# Patient Record
Sex: MALE | Race: WHITE | NOT HISPANIC OR LATINO | URBAN - METROPOLITAN AREA
[De-identification: names, ages, dates, MRNs, and addresses within clinical notes are randomized per-mention and may not be internally consistent; named-entity substitution may affect disease eponyms.]

---

## 2021-01-11 ENCOUNTER — TELEPHONE (OUTPATIENT)
Dept: SURGERY | Facility: CLINIC | Age: 36
End: 2021-01-11

## 2021-01-11 ENCOUNTER — OFFICE VISIT (OUTPATIENT)
Dept: FAMILY MEDICINE | Facility: CLINIC | Age: 36
End: 2021-01-11

## 2021-01-11 VITALS
HEIGHT: 70 IN | OXYGEN SATURATION: 99 % | DIASTOLIC BLOOD PRESSURE: 82 MMHG | WEIGHT: 290 LBS | BODY MASS INDEX: 41.52 KG/M2 | SYSTOLIC BLOOD PRESSURE: 126 MMHG | RESPIRATION RATE: 17 BRPM | HEART RATE: 74 BPM

## 2021-01-11 DIAGNOSIS — Z13.228 SCREENING FOR ENDOCRINE, NUTRITIONAL, METABOLIC AND IMMUNITY DISORDER: ICD-10-CM

## 2021-01-11 DIAGNOSIS — Z11.59 NEED FOR HEPATITIS C SCREENING TEST: ICD-10-CM

## 2021-01-11 DIAGNOSIS — E66.01 CLASS 3 SEVERE OBESITY DUE TO EXCESS CALORIES WITHOUT SERIOUS COMORBIDITY WITH BODY MASS INDEX (BMI) OF 40.0 TO 44.9 IN ADULT (H): ICD-10-CM

## 2021-01-11 DIAGNOSIS — Z13.0 SCREENING FOR ENDOCRINE, NUTRITIONAL, METABOLIC AND IMMUNITY DISORDER: ICD-10-CM

## 2021-01-11 DIAGNOSIS — Z13.21 SCREENING FOR ENDOCRINE, NUTRITIONAL, METABOLIC AND IMMUNITY DISORDER: ICD-10-CM

## 2021-01-11 DIAGNOSIS — Z11.4 ENCOUNTER FOR SCREENING FOR HIV: ICD-10-CM

## 2021-01-11 DIAGNOSIS — Z00.00 ROUTINE GENERAL MEDICAL EXAMINATION AT A HEALTH CARE FACILITY: Primary | ICD-10-CM

## 2021-01-11 DIAGNOSIS — Z13.220 SCREENING FOR LIPOID DISORDERS: ICD-10-CM

## 2021-01-11 DIAGNOSIS — L73.2 AXILLARY HIDRADENITIS SUPPURATIVA: ICD-10-CM

## 2021-01-11 DIAGNOSIS — Z23 INFLUENZA VACCINE NEEDED: ICD-10-CM

## 2021-01-11 DIAGNOSIS — E66.813 CLASS 3 SEVERE OBESITY DUE TO EXCESS CALORIES WITHOUT SERIOUS COMORBIDITY WITH BODY MASS INDEX (BMI) OF 40.0 TO 44.9 IN ADULT (H): ICD-10-CM

## 2021-01-11 DIAGNOSIS — Z13.29 SCREENING FOR ENDOCRINE, NUTRITIONAL, METABOLIC AND IMMUNITY DISORDER: ICD-10-CM

## 2021-01-11 PROCEDURE — 99385 PREV VISIT NEW AGE 18-39: CPT | Mod: 25 | Performed by: NURSE PRACTITIONER

## 2021-01-11 PROCEDURE — 36415 COLL VENOUS BLD VENIPUNCTURE: CPT | Performed by: NURSE PRACTITIONER

## 2021-01-11 PROCEDURE — 90471 IMMUNIZATION ADMIN: CPT | Performed by: NURSE PRACTITIONER

## 2021-01-11 PROCEDURE — 90686 IIV4 VACC NO PRSV 0.5 ML IM: CPT | Performed by: NURSE PRACTITIONER

## 2021-01-11 RX ORDER — CLINDAMYCIN PHOSPHATE 10 UG/ML
LOTION TOPICAL 2 TIMES DAILY
Qty: 60 ML | Refills: 11 | Status: SHIPPED | OUTPATIENT
Start: 2021-01-11 | End: 2023-05-25

## 2021-01-11 RX ORDER — FLUTICASONE PROPIONATE 50 MCG
2 SPRAY, SUSPENSION (ML) NASAL
COMMUNITY
Start: 2019-11-29 | End: 2024-03-01

## 2021-01-11 SDOH — HEALTH STABILITY: MENTAL HEALTH: HOW OFTEN DO YOU HAVE A DRINK CONTAINING ALCOHOL?: NOT ASKED

## 2021-01-11 SDOH — HEALTH STABILITY: MENTAL HEALTH: HOW OFTEN DO YOU HAVE 6 OR MORE DRINKS ON ONE OCCASION?: NOT ASKED

## 2021-01-11 SDOH — HEALTH STABILITY: MENTAL HEALTH: HOW MANY STANDARD DRINKS CONTAINING ALCOHOL DO YOU HAVE ON A TYPICAL DAY?: NOT ASKED

## 2021-01-11 ASSESSMENT — MIFFLIN-ST. JEOR: SCORE: 2253.74

## 2021-01-11 NOTE — TELEPHONE ENCOUNTER
Reason for call:  Other   Patient called regarding (reason for call): appointment  Additional comments: Please attach non surgical questionnaires for appointment tomorrow.     Phone number to reach patient:  Home number on file [phone] (home)    Best Time:  Anytime     Can we leave a detailed message on this number?  Not Applicable    Travel screening: Not Applicable

## 2021-01-11 NOTE — PATIENT INSTRUCTIONS
Preventive Health Recommendations  Male Ages 26 - 39    Yearly exam:             See your health care provider every year in order to  o   Review health changes.   o   Discuss preventive care.    o   Review your medicines if your doctor has prescribed any.    You should be tested each year for STDs (sexually transmitted diseases), if you re at risk.     After age 35, talk to your provider about cholesterol testing. If you are at risk for heart disease, have your cholesterol tested at least every 5 years.     If you are at risk for diabetes, you should have a diabetes test (fasting glucose).  Shots: Get a flu shot each year. Get a tetanus shot every 10 years.     Nutrition:    Eat at least 5 servings of fruits and vegetables daily.     Eat whole-grain bread, whole-wheat pasta and brown rice instead of white grains and rice.     Get adequate Calcium and Vitamin D.     Lifestyle    Exercise for at least 150 minutes a week (30 minutes a day, 5 days a week). This will help you control your weight and prevent disease.     Limit alcohol to one drink per day.     No smoking.     Wear sunscreen to prevent skin cancer.     See your dentist every six months for an exam and cleaning.     Clindamycin twice daily for 16 weeks    General Information:     Today you had your appointment with Kenzie Canada CNP  My hours are:     Monday 8 AM - 5 PM  Tuesday: 8 AM - 5 PM  Wednesday: 8 AM - 5 PM  Thursday: 8 AM - 5 PM     I am not in the office Fridays. Therefore, non urgent calls received on Friday will be addressed when I am back in the office on Monday.      If lab work was done today as part of your evaluation you will generally be contacted via My Chart, mail, or phone with the results within 1-5 days. If there is an alarming result we will contact you by phone. Lab results come back at varying times- I generally wait until all labs are resulted before making comments on results. Please note labs are automatically released to  My Chart once available.      If you need refills please contact your pharmacy. They will send a refill request to me to review. Please allow 3 business days for us to process all refill requests.      Please call or send a medical message with any questions or concerns    We are working to improve our digital reputation.  Would you please help us by reviewing our clinic on Google and/or Facebook?  These are links for filling out a review for the clinic:    [email or web address]                [email or web address]    We truly appreciate you taking the time to do this.

## 2021-01-11 NOTE — PROGRESS NOTES
"  3  SUBJECTIVE:   CC: Yamil Spencer is an 35 year old male who presents for preventive health visit.     #1) Weight: Has gained weight the past year. Joined a fitness group, exercises four times per week. Notes that he stress eats. Normally 260, sometimes 270. Would like to be 240-250. Lowest was 225 when he was running every day and eating spinach. Felt really good around 240. Works around food daily, wants to make sure he has a healthy relationship with food. Sister is being treated for an eating disorder currently.  #2) Skin lesions: Notes that he gets painful pimple like bumps in his armpits, upper thighs, and groin. Has been on oral medication in the past with flares and this has been helpful.        Patient has been advised of split billing requirements and indicates understanding: Yes  Healthy Habits:    Do you get at least three servings of calcium containing foods daily (dairy, green leafy vegetables, etc.)? yes    Amount of exercise or daily activities, outside of work: 4 day(s) per week    Problems taking medications regularly No    Medication side effects: No    Have you had an eye exam in the past two years? yes    Do you see a dentist twice per year? yes    Do you have sleep apnea, excessive snoring or daytime drowsiness?no    Today's PHQ-2 Score:   PHQ-2 ( 1999 [employer]) 1/11/2021 12/18/2015   Q1: Little interest or pleasure in doing things 0 0   Q2: Feeling down, depressed or hopeless 0 0   PHQ-2 Score 0 0     Feels mental health is good but plans to see a counselor- describes \"minor anxiety\", always fidgeting, but states he has always been this way. Sleeping okay.    Abuse: Current or Past(Physical, Sexual or Emotional)- No  Do you feel safe in your environment? Yes    Social History     Tobacco Use     Smoking status: Former Smoker     Packs/day: 0.50     Years: 6.00     Pack years: 3.00     Types: Cigarettes     Smokeless tobacco: Never Used   Substance Use Topics     Alcohol use: Yes     " "Alcohol/week: 0.0 standard drinks     Comment: 3 beers a day.     If you drink alcohol do you typically have >3 drinks per day or >7 drinks per week? Not Applicable      Reviewed orders with patient. Reviewed health maintenance and updated orders accordingly - Yes      Reviewed and updated as needed this visit by clinical staff  Tobacco  Allergies               Reviewed and updated as needed this visit by Provider  Tobacco  Allergies  Meds  Problems  Med Hx  Surg Hx  Fam Hx         Past Medical History:   Diagnosis Date     Acne     folliculitis     Plantar fasciitis, bilateral      Seasonal allergies      Tobacco abuse     quit smoking in 2019. 10 pack year history.      Past Surgical History:   Procedure Laterality Date     ANKLE SURGERY  01/01/2008    fracture rt, ORIF plate and screws     KNEE SURGERY Left 2003    chipped bone       ROS:  Gen, HEENT, CV, resp, GI, , MSK, heme/lymph, endo, skin, neuro, psych negative except as listed per HPI      OBJECTIVE:   Physical Exam:    /82   Pulse 74   Resp 17   Ht 1.765 m (5' 9.5\")   Wt 131.5 kg (290 lb)   SpO2 99%   BMI 42.21 kg/m      CONSTITUTIONAL: Alert non-toxic appearing male in no acute distress  HEAD: Normocephalic, atraumatic  EYES: PERRLA; no scleral icterus; sclera without injection  ENT: EACs clear bilaterally, TMs pearly gray and intact with good visualization of bony landmarks and cone of light, no bulging or erythema; nares patent without ulceration or edema; oropharynx pink without lesions; posterior pharynx without exudates  NECK: Neck supple without lymphadenopathy, thyroid without enlargement or nodularity  RESPIRATORY: Lungs clear to auscultation, respirations unlabored  CV: Regular rate and rhythm, S1S2, no clicks, murmurs, rubs, or gallops; bilateral lower extremities without edema, posterior tibialis pulses 2+ bilaterally  GASTROINTESTINAL: Normoactive bowel sounds x4 quadrants, abdomen soft, non-distended, and non-tender to " palpation without masses or organomegaly  MUSCULOSKELETAL: Moves all extremities, no obvious muscle wasting  NEUROLOGIC: No gross deficits, normal gait  SKIN: Appropriate color for race, warm and dry, no suspicious lesions or rashes; scattered erythematous papular lesions to axillae bilaterally  PSYCHIATRIC: Pleasant and interactive, affect euthymic, makes appropriate eye contact, thought process logical      Diagnostic Test Results:  Labs reviewed in Epic  No results found for this or any previous visit (from the past 24 hour(s)).    ASSESSMENT/PLAN:   Yamil was seen today for new patient, physical, recheck medication and weight check.    Diagnoses and all orders for this visit:    Routine general medical examination at a health care facility    Well appearing 35 year old male. See below for counseling.    Need for hepatitis C screening test  -     HCV Antibody ([facility])    Encounter for screening for HIV  -     HIV 1/0/2 Rflx ([facility])    Screening for lipoid disorders  -     Lipid Panel ([facility])    Screening for endocrine, nutritional, metabolic and immunity disorder  -     Basic metabolic panel; Future    Notes fam hx of diabetes and pre-diabetes- if fasting glucose elevated, add on A1C    Class 3 severe obesity due to excess calories without serious comorbidity with body mass index (BMI) of 40.0 to 44.9 in adult (H)  -     COMPREHENSIVE WEIGHT MANAGEMENT    Discussed weight loss measures, would like to see medical weight management clinic and nutritionist. His goal weight is about 240lb as he felt really good at this weight.    Influenza vaccine needed  -     HC FLU VAC PRESRV FREE QUAD SPLIT VIR > 6 MONTHS IM  -     ADMIN 1st VACCINE    Axillary hidradenitis suppurativa  -     clindamycin (CLEOCIN T) 1 % external lotion; Apply topically 2 times daily    Topical clindamycin twice daily x16 weeks. To call for flares, may need oral doxycycline during these periods.    Patient has been advised of split billing  "requirements and indicates understanding: Yes  COUNSELING:  Reviewed preventive health counseling, as reflected in patient instructions  Special attention given to:        Regular exercise       Healthy diet/nutrition       Immunizations    Vaccinated for: Influenza             Consider Hep C screening for all patients one time for ages 18-79 years       HIV screeninx in teen years, 1x in adult years, and at intervals if high risk    Estimated body mass index is 39.46 kg/m  as calculated from the following:    Height as of 12/18/15: 1.778 m (5' 10\").    Weight as of 12/18/15: 124.7 kg (275 lb).    Weight management plan: Patient referred to endocrine and/or weight management specialty    He reports that he has quit smoking. His smoking use included cigarettes. He has a 3.00 pack-year smoking history. He has never used smokeless tobacco.      Counseling Resources:  ATP IV Guidelines  Pooled Cohorts Equation Calculator  FRAX Risk Assessment  ICSI Preventive Guidelines  Dietary Guidelines for Americans,   USDA's MyPlate  ASA Prophylaxis  Lung CA Screening    MARY Luo CNP  [facility]  "

## 2021-01-12 ENCOUNTER — VIRTUAL VISIT (OUTPATIENT)
Dept: SURGERY | Facility: CLINIC | Age: 36
End: 2021-01-12
Payer: COMMERCIAL

## 2021-01-12 VITALS — BODY MASS INDEX: 42.95 KG/M2 | WEIGHT: 290 LBS | HEIGHT: 69 IN

## 2021-01-12 VITALS — HEIGHT: 69 IN | WEIGHT: 290 LBS | BODY MASS INDEX: 42.95 KG/M2

## 2021-01-12 DIAGNOSIS — E66.9 OBESITY: ICD-10-CM

## 2021-01-12 DIAGNOSIS — E66.01 MORBID OBESITY (H): Primary | ICD-10-CM

## 2021-01-12 DIAGNOSIS — E66.01 MORBID OBESITY (H): ICD-10-CM

## 2021-01-12 DIAGNOSIS — L73.2 AXILLARY HIDRADENITIS SUPPURATIVA: ICD-10-CM

## 2021-01-12 PROCEDURE — 99417 PROLNG OP E/M EACH 15 MIN: CPT | Performed by: PHYSICIAN ASSISTANT

## 2021-01-12 PROCEDURE — 97802 MEDICAL NUTRITION INDIV IN: CPT | Mod: GT | Performed by: DIETITIAN, REGISTERED

## 2021-01-12 PROCEDURE — 99205 OFFICE O/P NEW HI 60 MIN: CPT | Mod: 95 | Performed by: PHYSICIAN ASSISTANT

## 2021-01-12 RX ORDER — PHENTERMINE HYDROCHLORIDE 15 MG/1
15 CAPSULE ORAL EVERY MORNING
Qty: 60 CAPSULE | Refills: 1 | Status: SHIPPED | OUTPATIENT
Start: 2021-01-12 | End: 2021-05-27

## 2021-01-12 ASSESSMENT — MIFFLIN-ST. JEOR
SCORE: 2245.81
SCORE: 2245.81

## 2021-01-12 NOTE — PROGRESS NOTES
"The patient has been notified of following:      \"This video visit will be conducted via a call between you and your physician/provider. We have found that certain health care needs can be provided without the need for an in-person physical exam.  This service lets us provide the care you need with a video conversation.  If a prescription is necessary we can send it directly to your pharmacy.  If lab work is needed we can place an order for that and you can then stop by our lab to have the test done at a later time.     Video visits are billed at different rates depending on your insurance coverage.  Please reach out to your insurance provider with any questions.     If during the course of the call the physician/provider feels a video visit is not appropriate, you will not be charged for this service.\"     Patient has given verbal consent for Video visit? Yes  How would you like to obtain your AVS? MyChart  Will anyone else be joining your video visit? No        Video-Visit Details     Type of service:  Video Visit     Video Start Time: 2:53pm  Video End Time: 4:00pm    Originating Location (pt. Location): Home     Distant Location (provider location):  [facility]     Platform used for Video Visit: [facility] WEIGHT LOSS INITIAL EVALUATION  DIAGNOSIS:  Obesity Class III    NUTRITION HISTORY:  Diet recall per PA-C visit earlier today:  Diet Recall Review with Patient  Wake Up:  5:30 am (drinks tea, caffeinated beverage  Breakfast:  7:45 am     Yogurt, protein shake  Lunch: 12-1 pm Chicken, cheese wrap with salad or fresh fruit  (Still has hunger following lunch)  Snack: tries to avoid (fresh berries)  Supper: 5:00-5:30 [place] protein, with veggies, (can eat 3 platefuls on the weekend)  Snack: following dinner will have snack of 2-3 string cheese, then popcorn    Do you typically eat snacks?  Yes  How many glasses of juice do you drink in a typical day?       How many of glasses of milk do you " "drink in a typical day?    Drinks: Water, tea with zero sugar      Other: Pt works as a  at a healthcare facility. Feels he has a good understanding of what he should be eating, but struggles with portion control and evening snacking. Often has unpredictable and short time periods to eat meals while at work. Good exercise habits, knows he just needs to make lifestyle changes with food habits. Pt had many appropriate questions today regarding macros, protein supplements, snacks and meal timing. Pt gluten intolerant and also gets bloated if he eats too much rice.     ANTHROPOMETRICS:  Height: 5' 9\"   Weight: 290 lbs 0 oz.   BMI: Body mass index is 42.83 kg/m .  NUTRITION DIAGNOSIS:   Obese, class III related to excess energy intake as evidence by BMI of  42.83 kg/m2.   NUTRITION INTERVENTIONS  Nutrition Prescription:  Recommend modified energy- nutrient intake  Implementation:  Nutrition Education (Content):    Discussed portion sizes and plate method    Determined alternative to emotional eating    Provided handouts: Tips for Weight Loss and Weight Management, Protein Sources for Weight Loss, Tips for Increasing Fiber Intake, Plate Method    Nutrition Education (Application):     Patient to practice goals as stated below    Patient verbalizes understanding of diet by stating he will practice portion control    Anticipate good compliance      Goals:  Practice alternative activities to mindless snacking  Eat slowly - 20-30 minutes per meal  Limit starchy foods to 25% of meal  If tracking intake: 2929-3774 calories, 60-90g protein, 25-35g fiber    FOLLOW UP AND MONITORING:    Other  - follow up in 4-8 weeks.     TIME SPENT WITH PATIENT:   67 minutes       "

## 2021-01-12 NOTE — PATIENT INSTRUCTIONS
Goals:   Eat within 1 hour of waking  Take 20 minutes for your meals.  Have labs drawn at a [place] lab.     FOLLOW-UP:  Return to clinic in 6 weeks.     MEDICATION STARTED AT THIS APPOINTMENT  We are starting Phentermine. Take one tablet in the morning. If tolerating but hunger not controlled can icnrease to 2  at week 2. Contact the nurse via [employer] or call [phone] if you have any questions or concerns. (Do not stop taking it if you don't think it's working. For some people it works without them knowing it.)    Phentermine is being prescribed because you identified hunger as one of the main causes for your extra weight.      Our patients on Phentermine find that they:    >feel less hunger    >find it easier to push the plate away   >have an easier time eating less    For some of our patients, these feelings are very real and immediate. For other patients, the feelings are less obvious. They don't feel much of a change but find they've lost weight. Like all weight loss medications, Phentermine  works best when you help it work. This means:  1. Having less tempting high calorie (fattening) food around the house or office. (For people with strong cravings this is very important.)   2. Staying away from situations or people that may trigger your cravings .   3. Eating out only one time or less each week.  4. Eating your meals at a table with the TV or computer off.    Side-effects. Phentermine is generally well tolerated. The main side-effects we see are feelings of racing pulse or rapid heart beat. Some people can get an elevated blood pressure. Because of this we may have you come back within a week or so of starting the medication for a blood pressure check.         In order to get refills of this or any medication we prescribe you must be seen in the medical weight mgmt clinic every 2-3 months.    Please check blood pressure/pulse 7 days after starting.  If BP above 140/90 or pulse is greater than 100  contact clinic.    10 Healthy Habits  Eat Better ? Move More ? Live Well  1.  Eat breakfast daily.      Try to eat within the first hour after you wake up. This helps you control your appetite during the day, and you are less likely to snack in the evening.     80% of people who skip meals are overweight or obese.    2.   Include protein with every meal, even breakfast.     Protein will suppress your appetite longer than other types of food. This helps you  feel more satisfied with the amount of food you have eaten.    3.  Fill up on Fiber    Fiber comes from plants--fruits, veggies, whole grains, nuts/seeds and beans    Fiber is low in calories, high in phytonutrients and helps you stay full longer    4.  Eat S-L-O-W-L-Y    Take 20-30 minutes to eat each meal by taking small bites, chewing foods to applesauce consistency or 20-30 times before you swallow    Eating foods too fast can delay satiety/fullness signals and increase overeating     5.  Avoid Mindless Eating    Be present when you eat--take note of the smell, taste and quality of your food    Make a list of alternative activities you could do to prevent boredom/stress eating  Go for a walk, call a friend, chew gum, paint your nails    6.  Keep a Journal    Writing down what you eat, how you feel and when you are active helps you identify new changes to work on from week to week          Look for ways to cut 100 calories from your current diet 2-3 times per day    7.  Drink 64 ounces of Non Calorie drinks between meals    Water    Zero calorie Propel , Vitamin Water , Crystal Light     Avoid regular soda pop    8.  Stop thinking about food choices as a  diet.     Instead, think of them as healthy, lifelong habits--an effort toward a new way of eating.    Weigh yourself once a week instead of everyday.     9.  Get enough sleep--at least 7 to 8 hours each night.     Lack of sleep is one reason that fat builds up around the waist.    10.  Exercise five to six  times per week     Do a combination aerobic exercise (fast walking, biking, swimming) and strength training (Dumbbells, pushups, weight machines, resistance bands).    Start at 10 minutes per day and slowly work your way up to 30 minutes or more.   Taking the stairs instead of the elevator, park at the far end of the parking lot, pace while on the phone, take the dog for a walk.     [email or web address]

## 2021-01-12 NOTE — PATIENT INSTRUCTIONS
"Dwayne Luna!    It was great meeting with you today! Here are some links to the handouts I referenced:     Tips for Weight Loss:  [email or web address]     Protein Sources:  [email or web address]     Fiber Sources:  [email or web address]     My Plate:  https://www.choosemyplate.gov/     A helpful search term to type into [employer], etc is \"myplate examples.\"     Key points from today:  Eat 3 meals/day at consistent intervals  Shoot for 60-90g protein (20-30g/meal)  Aim for 25-35g fiber (6-10g/meal)  Eat slowly: 20-30 minutes per meal     Here is a summary of the goals that we discussed:     1. Practice non-food alternative activities to mindless/emotional eating: ie crossword puzzles, games, arts/crafts/hobbies, household chores, etc.      2. Limit starchy foods to 25% of your meal. As discussed, check out ancient grains such as quinoa or millet. Other options are farro or barley, but these will contain gluten.      3. Take 20-30 minutes to eat meals    4. If logging your food, aim for 1364-0306 calories, 60-90g protein and 25-35g fiber. The easiest way to track this via My Fitness Pal is to use the \"nutrients\" tab rather than the \"macros\" tab. You are also more than welcome to pass these guidelines on to your gym if utilizing a nutrition program.     5. Try having something a little smaller upon waking up for breakfast (a yogurt is what we discussed). After working out, have a more well-rounded meal. Aim for have a meal every 4-6 hours. As discussed, a protein shake mid-afternoon can be very helpful regarding bridging you to dinner.            Let's plan on following up in 1-2 months. This can be scheduled via the call center at . Of course, reach out  sooner if you have further questions. Have a great week and welcome to the program!        Cristal Churchill RD, LD  Clinical Dietitian  "

## 2021-01-12 NOTE — PROGRESS NOTES
"Jeremy is a 35 year old who is being evaluated via a billable video visit.        If the video visit is dropped, the invitation should be resent by: Text to cell phone: [phone]  Will anyone else be joining your video visit? No      Video-Visit Details    Type of service:  Video Visit    Video Start Time: 10:39 AM    Video End Time:12:01 PM    Originating Location (pt. Location): Home    Distant Location (provider location):  [facility] [place]   Platform used for Video Visit: BookNow video and phone    New Bariatric Surgery Consultation Note    2021    RE: Yamil Spencer  MR#: [id number]  : 1985      Referring provider:       2021   Who referred you? Libby Dorsey       Chief Complaint/Reason for visit: evaluation for possible weight loss surgery    Dear Kenzie Canada APRN CNP (General),    I had the pleasure of seeing your patient, Yamil Spencer, to evaluate his obesity and consider him for possible weight loss surgery. As you know, Yamil Spencer is 35 year old.  He has a height of 5' 9\", a weight of 290 lbs 0 oz, and calculated Body mass index is 42.83 kg/m .      ASSESSMENT & PLAN:    Problem List Items Addressed This Visit     Morbid obesity (H) - Primary    Relevant Medications    phentermine (ADIPEX-P) 15 MG capsule    Other Relevant Orders    Nutrition Referral    Comprehensive metabolic panel    Hemoglobin A1c    TSH with free T4 reflex    Vitamin D Deficiency    Axillary hidradenitis suppurativa           PROGRAM OVERVIEW  Reviewed options at [facility] including provider visits, dietician, 24 week program, health coaching, food supplements, Get Moving Program, and psychological support.  All of patients questions about the weight loss program were answered fully.     SURGICAL WEIGHT LOSS   Options presented. Given pt BMI is: Body mass index is 42.83 kg/m .      MEDICATIONS:  We discussed healthy habits to " "assist with weight loss. We reviewed medications associated with weight gain. We discussed the role of pharmacological agents in the treatment of obesity and the \"off-label\" use of medications in this practice. We reviewed medication that may assist with weight loss. Indications, contraindications, risks/benefits, and potential side effects were discussed.   Phentermine 15 mg was prescribed. Discussed that medications must always be used together with lifestyle changes such as improvements in diet choices, portion control and establishing and maintaining a regular exercise program.     CURRENT GOALS:   Eat within 1 hour of waking  Take 20 minutes for your meals.    NUTRITION: referral ordered today     - NUTRITION REFERRAL    LABS:   Ordered   HgA1C  Vit D  CMP  TSH    COUNSELING:   We discussed Bariatric Basics including:  -eating 3 meals daily  -eating protein first  -eating slowly, chewing food well  -avoiding/limiting calorie containing beverages  -limiting carbohydrates and changing to whole grains  -limiting restaurant or cafeteria eating to twice a week or less    We discussed the importance of restorative sleep and stress management in maintaining a healthy weight.  We discussed insulin resistance and glycemic index as it relates to appetite and weight control.   We discussed the importance of physical activity including cardiovascular and strength training in maintaining a healthier weight and explored viable options.  We discussed the National Weight Control Registry healthy weight maintenance strategies and ways to optimize metabolism.  Patient education of above written in AVS.     Follow up: Return to clinic in 6 weeks.    100 minutes spent on the date of the encounter doing chart review, patient visit and documentation       HISTORY OF PRESENT ILLNESS:  Weight Loss History Reviewed with Patient 1/11/2021   How long have you been overweight? Since early childhood   What is the most that you have ever " weighed? 297   What is the most weight you have lost? 70   I have tried the following methods to lose weight Exercise, stress management.  Planning out meals   I have tried the following weight loss medications? (Check all that apply) None   Have you ever had weight loss surgery? No       CO-MORBIDITIES OF OBESITY INCLUDE:     1/11/2021   I have the following health issues associated with obesity: None of the above   Pt has never been on medication for weight loss in the past.  Is very active but weight stays the same despite this.  Pt is a  so overall he is a healthy eater but has large portions and snacking in evening.      PAST MEDICAL HISTORY:  Past Medical History:   Diagnosis Date     Acne     folliculitis     Plantar fasciitis, bilateral      Seasonal allergies      Tobacco abuse     quit smoking in 2019. 10 pack year history.       Diet Recall Review with Patient  Wake Up:  5:30 am (drinks tea, caffeinated beverage  Breakfast:  7:45 am  Yogurt, protein shake  Lunch: 12-1 pm Chicken, cheese wrap with salad or fresh fruit  (Still has hunger following lunch)  Snack: tries to avoid (fresh berries)  Supper: 5:00-5:30 [place] protein, with veggies, (can eat 3 platefuls on the weekend)  Snack: following dinner will have snack of 2-3 string cheese, then popcorn    Do you typically eat snacks? Yes  How many glasses of juice do you drink in a typical day?   How many of glasses of milk do you drink in a typical day?   Drinks: Water, tea with zero sugar    How often do you have a drink of alcohol? Minimal only on special occasions.     Eating Habits  (Never, Weekly,Most Days, Daily)  Generally, my meals include a lot of starches and desserts:   Generally, my meals include fatty foods   IEat fast food: never  Eat at a buffet or sit-down restaurant: rarely  Most meal eaten meals in front of the TV or computer: no  Often skip meals, eat at random times, have no regular eating times: no   Rarely sit down for a meal but  snack or graze throughout:  At night  Eat extra snacks between meals. Yes  Eat most of my food at the end of the day. Daily  Eat in the middle of the night or wake up at night to eat. Never   Eat extra snacks to prevent or correct low blood sugar. Never   Eat to prevent acid reflux or stomach pain.  Never  Worry about not having enough food to eat. Less Than Weekly: none   Eat rapidly: Takes 5 minutes to eat meal.    Eat until uncomfortably full: 3x Weekly  Eat when depressed: Weekly  Eat when stressed. Weekly  Eat when Bored: Weekly  Eat when anxious.Weekly   Eat when happy or as a reward: Never   I feel hungry all the time even if I just have eaten. Daily   Feeling full is important to me. Yes   I finish all the food on my plate even if I am already full. 100% of time   Can't resist eating delicious food or walk past the good food/smell. Weekly  I eat/snack without noticing that I am eating. Weekly  I eat when preparing the meal.  Weekly   I have trouble not eating sweets, ice cream, cookies, or chips if they are around the house: Daily   I think about food all day: Yes, but pt is a .   What foods, if any, do you crave? fried foods, chips, grilled foods.     PAST SURGICAL HISTORY:  Past Surgical History:   Procedure Laterality Date     ANKLE SURGERY  01/01/2008    fracture rt, ORIF plate and screws     KNEE SURGERY Left 2003    chipped bone       FAMILY HISTORY: Dad, Mom, uncles are obese.  Family History   Problem Relation Age of Onset     Obesity Mother      Hypertension Mother      Heart Failure Father         passed away in his 60s, unclear cause     Diabetes Father      No Known Problems Sister      Coronary Artery Disease Maternal Grandmother         passed away in 80s     Alzheimer Disease Maternal Grandmother      Coronary Artery Disease Maternal Grandfather         passed away in 60s     Unexplained death Paternal Grandmother         passed away in 50s     Unexplained death Paternal Grandfather          passed away 60s-70s       SOCIAL HISTORY:   Social History Questions Reviewed With Patient 1/11/2021   Which best describes your employment status (select all that apply) I work full-time   If you work, what is your occupation?    Which best describes your marital status:    Do you have children? No   Who do you have in your support network that can be available to help you for the first 2 weeks after surgery? Wife   Who can you count on for support throughout your weight loss surgery journey? Wife family   Can you afford 3 meals a day?  Yes   Can you afford 50-60 dollars a month for vitamins? Yes       HABITS:     1/11/2021   How often do you drink alcohol? Monthly or less   Have you ever used any of the following nicotine products? E-cigarettes   If you previously used any of these products, what year did you quit? 2018   Have you or are you currently using street drugs or prescription strength medication for which you do not have a prescription for? No   Do you have a history of chemical dependency (alcohol or drug abuse)? No       PSYCHOLOGICAL HISTORY:   Psychological History Reviewed With Patient 1/11/2021   Have you ever attempted suicide? Never.   Have you had thoughts of suicide in the past year? No   Have you ever been hospitalized for mental illness or a suicide attempt? Never.   Do you have a history of chronic pain? No   Have you ever been diagnosed with fibromyalgia? No   Are you currently seeing a therapist or counselor?  Yes   Are you currently seeing a psychiatrist? No   Started seeing a therapist.      ROS:     1/11/2021   Skin:  None of the above   HEENT: None of these   Musculoskeletal: Back pain, None of the above   Cardiovascular: None of the above   Pulmonary: None of the above   Gastrointestinal: None of the above   Genitourinary: None of the above   Hematological: None of the above   Neurological: None of the above     ROS:  HEENT  Hx of glaucoma:  none  Cardiovascular  Chest Pain with Exertion:   Palpitations: none  Hx of heart disease: none  HTN:none  Pulmonary  Shortness of breath at rest: none  Shortness of breath with exertion: none  Psychiatric  Moods Stable: yes  Thyroid disease:none  Thyroid Cancer: none  Neurologic:  Hx of seizures: none    EATING BEHAVIORS:     1/11/2021   Have you or anyone else thought that you had an eating disorder? Yes   If you answered yes to the previous eating disorder question, select the types that apply from this list: Binge Eating, at night when really hungry.   Do you currently binge eat (eat a large amount of food in a short time)? Yes. Occurs more often at end of day.  PT was raised that he needs to eat until full and finish everything on your plate.    Are you an emotional eater? Yes   Do you get up to eat after falling asleep? No      I eat a large amount of food, like a loaf of bread, a box of cookies, a pint/quart of ice cream, all at once. Once weekly  I eat a large amount of food even when I am not hungry.   I eat alone because I feel embarrassed and do not want others to see how much I have eaten. Never  I eat until I am uncomfortably full.   I feel bad, disgusted, or guilty after I overeat.Weekly.      EXERCISE:     1/11/2021   How often do you exercise? 3 to 4 times per week   What is the duration of your exercise (in minutes)? 45 Minutes   What types of exercise do you do? group fitness classes ([employer])   What keeps you from being more active?  I am as active as I can possibly be       MEDICATIONS:  Current Outpatient Medications   Medication     clindamycin (CLEOCIN T) 1 % external lotion     fluticasone (FLONASE) 50 MCG/ACT nasal spray     loratadine (CLARITIN) 5 MG chewable tablet     phentermine (ADIPEX-P) 15 MG capsule     No current facility-administered medications for this visit.         ALLERGIES:  No Known Allergies    LABS AND RECORDS REVIEWED:  Vitamin D Deficiency screening   Date Value Ref Range  "Status   10/25/2013 43 30 - 75 ug/L Final     Comment:     Season, race, dietary intake, and treatment affect the concentration of   25-hydroxy-Vitamin D. Values may decrease during winter months and increase   during summer months. Values less than 30 ug/L may indicate Vitamin D   deficiency.   Vitamin D determiniation is routinely performed by an immunoassay specific   for   25 hydroxyvitamin D3.  If an individual is on vitamin D2 (ergocalciferol)   supplementation, please specify 25 OH vitamin D2 and D3 level determination   by   LCMSMS test VITD23.  For questions, please contact the laboratory at   [phone].     TSH   Date Value Ref Range Status   10/25/2013 1.20 0.4 - 5.0 mU/L Final     Glucose   Date Value Ref Range Status   10/25/2013 90 60 - 99 mg/dL Final     Comment:     Fasting specimen     ALT   Date Value Ref Range Status   11/12/2007 33 0 - 70 U/L Final     Cholesterol   Date Value Ref Range Status   12/18/2015 176 <200 mg/dL Final     HDL Cholesterol   Date Value Ref Range Status   12/18/2015 44 >39 mg/dL Final     LDL Cholesterol Calculated   Date Value Ref Range Status   12/18/2015 118 (H) <100 mg/dL Final     Comment:     Above desirable:  100-129 mg/dl   Borderline High:  130-159 mg/dL   High:             160-189 mg/dL   Very high:       >189 mg/dl       Triglycerides   Date Value Ref Range Status   12/18/2015 69 <150 mg/dL Final     Comment:     Fasting specimen     WBC   Date Value Ref Range Status   11/12/2007 7.4 4.0 - 11.0 10e9/L Final     Hemoglobin   Date Value Ref Range Status   11/12/2007 15.2 13.3 - 17.7 g/dL Final     Hematocrit   Date Value Ref Range Status   11/12/2007 44.7 40.0 - 53.0 % Final     MCV   Date Value Ref Range Status   11/12/2007 89 78 - 100 fl Final     Platelet Count   Date Value Ref Range Status   11/12/2007 151 150 - 450 10e9/L Final       BP Readings from Last 6 Encounters:   01/11/21 126/82   12/18/15 116/64   10/25/13 110/70       PHYSICAL EXAM:  Ht 5' 9\" " (1.753 m)   Wt 290 lb (131.5 kg)   BMI 42.83 kg/m    GENERAL: Healthy, alert and no distress  EYES: Eyes grossly normal to inspection.  No discharge or erythema, or obvious scleral/conjunctival abnormalities.  RESP: No audible wheeze, cough, or visible cyanosis.  No visible retractions or increased work of breathing.    SKIN: Visible skin clear. No significant rash, abnormal pigmentation or lesions.  NEURO: Cranial nerves grossly intact.  Mentation and speech appropriate for age.  PSYCH: Mentation appears normal, affect normal/bright, judgement and insight intact, normal speech and appearance well-groomed.      Sincerely,     Libby Dorsey PA-C

## 2021-01-13 LAB
CHOLEST SERPL-MCNC: 159 MG/DL (ref 100–199)
HCV AB SERPL QL IA: <0.1 S/CO RATIO (ref 0–0.9)
HDLC SERPL-MCNC: 49 MG/DL
HIV SCREEN 4TH GEN WITH RFLX: NON REACTIVE
LDL/HDL RATIO: 2 RATIO (ref 0–3.6)
LDLC SERPL CALC-MCNC: 96 MG/DL (ref 0–99)
TRIGL SERPL-MCNC: 74 MG/DL (ref 0–149)
VLDLC SERPL CALC-MCNC: 14 MG/DL (ref 5–40)

## 2021-01-15 DIAGNOSIS — E66.01 MORBID OBESITY (H): ICD-10-CM

## 2021-01-15 LAB — HBA1C MFR BLD: 5.2 % (ref 0–5.6)

## 2021-01-15 PROCEDURE — 84443 ASSAY THYROID STIM HORMONE: CPT | Performed by: PHYSICIAN ASSISTANT

## 2021-01-15 PROCEDURE — 80053 COMPREHEN METABOLIC PANEL: CPT | Performed by: PHYSICIAN ASSISTANT

## 2021-01-15 PROCEDURE — 36415 COLL VENOUS BLD VENIPUNCTURE: CPT | Performed by: PHYSICIAN ASSISTANT

## 2021-01-15 PROCEDURE — 82306 VITAMIN D 25 HYDROXY: CPT | Performed by: PHYSICIAN ASSISTANT

## 2021-01-15 PROCEDURE — 83036 HEMOGLOBIN GLYCOSYLATED A1C: CPT | Performed by: PHYSICIAN ASSISTANT

## 2021-01-18 LAB
ALBUMIN SERPL-MCNC: 4.1 G/DL (ref 3.4–5)
ALP SERPL-CCNC: 66 U/L (ref 40–150)
ALT SERPL W P-5'-P-CCNC: 39 U/L (ref 0–70)
ANION GAP SERPL CALCULATED.3IONS-SCNC: 5 MMOL/L (ref 3–14)
AST SERPL W P-5'-P-CCNC: 41 U/L (ref 0–45)
BILIRUB SERPL-MCNC: 0.5 MG/DL (ref 0.2–1.3)
BUN SERPL-MCNC: 24 MG/DL (ref 7–30)
CALCIUM SERPL-MCNC: 8.9 MG/DL (ref 8.5–10.1)
CHLORIDE SERPL-SCNC: 103 MMOL/L (ref 94–109)
CO2 SERPL-SCNC: 27 MMOL/L (ref 20–32)
CREAT SERPL-MCNC: 0.82 MG/DL (ref 0.66–1.25)
DEPRECATED CALCIDIOL+CALCIFEROL SERPL-MC: 40 UG/L (ref 20–75)
GFR SERPL CREATININE-BSD FRML MDRD: >90 ML/MIN/{1.73_M2}
GLUCOSE SERPL-MCNC: 80 MG/DL (ref 70–99)
POTASSIUM SERPL-SCNC: 3.9 MMOL/L (ref 3.4–5.3)
PROT SERPL-MCNC: 7.7 G/DL (ref 6.8–8.8)
SODIUM SERPL-SCNC: 135 MMOL/L (ref 133–144)
TSH SERPL DL<=0.005 MIU/L-ACNC: 0.97 MU/L (ref 0.4–4)

## 2021-02-28 NOTE — PROGRESS NOTES
"Video-Visit Details    Type of service:  Video Visit    Video Start Time (time video started): 9:04    Video End Time (time video stopped): 9:29    Originating Location (pt. Location): Home    Distant Location (provider location):  [facility] SURGICAL [facility] CLIN[facility] [place]     Mode of Communication:  Video Conference via [facility] WEIGHT LOSS FOLLOW UP      DIAGNOSIS:  Class II Obesity    NUTRITION HISTORY:    Breakfast: Greek yogurt at 5:30 a.m.carb. Balance tortilla with 3 egg whites, pear or apple @ 7:30 a.m.     Lunch:  fresh fruit, carb. Balance tortilla with chicken or leftovers from dinner or entree salad with chicken @  noon     Dinner:  steak or chicken or fish or pork,  Broccoli or asparagus or shahram greens. sweet potato or farro or quinoa @ 5:30     Snacks:  protein drink made with unsweetened almond milk @ 2:30-3 p.m./protein drink 7-7:30 pm     Beverage Choices:  water (1 gallon), bcaa water, green tea or robious, protein drinks      Exercise: HIIT-45 minutes 5X per week     Other: Patient feels like tracking on [employer] is helping with weight loss and his work out regimen. He is confused about what to do when the colleen tells him to eat more based on his workout routine. Suggested that he still try to stay in the 1881-4150 calorie range for weight loss. He gets hungry mid morning so discussed ideas for a mid morning snack.    ANTHROPOMETRICS:    Initial Weight: 290 pounds    Height: 69\"    Current Weight:  267 pounds per pt.    Weight Change:  decrease 23 pounds    BMI: 38.84 kg/m2    MEDICATIONS:  Phentermine    EVALUATION/PROGRESS TOWARDS GOALS:  Previous Goals:  Practice alternative activities to mindless snacking-improving  Eat slowly - 20-30 minutes per meal-met at 20 minutes  Limit starchy foods to 25% of meal-met  If tracking intake: 7241-5930 calories, 60-90g protein, 25-35g fiber-partially met someday's ~ 1300 kcal./ meeting protein and fiber     Previous " Nutrition Diagnosis:  Obese class III related to excess energy intake as evidence by BMI of 42.83 kg/m2     Current Nutrition Diagnosis:   Obese class II related to excess energy intake as evidence by BMI of 38.84 kg/m2  No change      INTERVENTION:    Nutrition Prescription:  Recommend modified nutrient intake by decreasing energy intake    Implementation:    Meals and Snacks: 3/2    Nutrition Education (Content):    Discussed previous goals and determined new goals   Reviewed volumetric eating guidelines and not eliminating all added fats    Encourage physical activity    Supported patient in attempted weight loss and behavior changes    Congratulated patient on successful weight loss     Patient verbalizes understanding of diet by asking appropriate questions about how to stay full    Anticipate good compliance    Goals:  Add a snack at mid morning to prevent getting overly hungry at lunch time (ex. Cottage cheese or Greek yogurt/ fruit or protein drink if unable to stop to eat)  Continue tracking on colleen, but keep calories in 6835-2087 calorie range    Follow Up/Monitoring:  Other  -  patient to follow up in 4 weeks    Time Spent With Patient:  25 Minutes  Ovi Anthony RD, LD  [facility]

## 2021-03-01 ENCOUNTER — VIRTUAL VISIT (OUTPATIENT)
Dept: SURGERY | Facility: CLINIC | Age: 36
End: 2021-03-01
Payer: COMMERCIAL

## 2021-03-01 VITALS — BODY MASS INDEX: 38.22 KG/M2 | WEIGHT: 267 LBS | HEIGHT: 70 IN

## 2021-03-01 DIAGNOSIS — E66.9 OBESITY: ICD-10-CM

## 2021-03-01 DIAGNOSIS — L73.2 AXILLARY HIDRADENITIS SUPPURATIVA: ICD-10-CM

## 2021-03-01 DIAGNOSIS — E66.01 MORBID OBESITY (H): Primary | ICD-10-CM

## 2021-03-01 PROCEDURE — 99214 OFFICE O/P EST MOD 30 MIN: CPT | Mod: 95 | Performed by: FAMILY MEDICINE

## 2021-03-01 PROCEDURE — 97803 MED NUTRITION INDIV SUBSEQ: CPT | Mod: GT | Performed by: DIETITIAN, REGISTERED

## 2021-03-01 ASSESSMENT — MIFFLIN-ST. JEOR: SCORE: 2149.73

## 2021-03-01 NOTE — PROGRESS NOTES
Jeremy is a 35 year old who is being evaluated via a billable video visit.      If the video visit is dropped, the invitation should be resent by: Text to cell phone: [phone]  Will anyone else be joining your video visit? No      Video-Visit Details    Type of service:  Video Visit    Video Start Time: 8:27 AM    Video End Time:8:48 AM        Originating Location (pt. Location): Home    Distant Location (provider location):  [facility] SURGICAL [facility] LOSS CLINIC [place]     Platform used for Video Visit: [facility]    Bariatric Care Clinic Non Surgical Follow up Visit   Date of visit: 3/1/2021  Physician: KARMEN Ding MD, MD  Primary Care is Kenzie Canada.  Yamil Spencer   35 year old  male     Initial Weight: 290#  Initial BMI: 42.83  Today's Weight:   Wt Readings from Last 1 Encounters:   03/01/21 121.1 kg (267 lb)     Body mass index is 38.84 kg/m .           Assessment and Plan   Assessment: Yamil is a 35 year old year old male who presents for medical weight management.      Plan:     Diagnosis Comments   1. Morbid obesity (H)  Patient was congratulated on his success thus far. Healthy habits to assist with further weight loss were discussed. He has done a great job with changing his habits. He will continue the phentermine 15 mg per day.   2. Axillary hidradenitis suppurativa  This may improve with healthy habits and weight loss.       Follow up in 2-3 months with myself or Libby           INTERIM HISTORY  Patient was started on 14 mg of phentermine approximately 6 weeks ago. He has lost 23 pounds! He is tolerating it well and feels that it not only helps to control his appetite it also helps his concentration. He has not been taking the medication on the weekends. He is no longer binging.    DIETARY HISTORY  Meals Per Day: 3  Eating Protein First?: yes  Food Diary: B:greek yogurt then 3 egg whites with low carb tortilla and piece of fruit 2 hours later L:protein and fruit and sometimes  "vegetables sometimes low carb wrap or leftovers D:protein, carb (fruit), and vegetable  Snacks Per Day: mid afternoon and after dinner  Typical Snack: protein shake  Fluid Intake: 64 oz  Portion Control: improved  Calorie Containing Beverages: protein shakes  Typical Protein Food Choices: meat, egg whites, yogurt, protein shakes  Choosing Whole Grains: yes  Meals at Restaurant per week:0-2    Positive Changes Since Last Visit: increased exercise  Struggling With: none    Knowledgeable in Reading Food Labels: yes  Getting Adequate Protein: yes  Sleeping 7-8 hours/day yes  Stress management not discussed    PHYSICAL ACTIVITY PATTERNS:  Cardiovascular: HIIT 45 min 5 days per week  Strength Training: HIIT 45 min 5 days per week    REVIEW OF SYSTEMS  GENERAL/CONSTITUTIONAL:  Fatigue: no  HEENT:  Vision changes, glaucoma: no  CARDIOVASCULAR:  Chest Pain with Exertion: no  PULMONARY:  Dyspnea on exertion: no  NEUROLOGIC:  Paresthesias: no  PSYCHIATRIC:  Moods: stable  MUSCULOSKELETAL/RHEUMATOLOGIC  Arthralgias: no  Myalgias: no  ENDOCRINE:  Monitoring Blood Sugars: na  Sugars Well Controlled: na       Patient Profile   Social History     Social History Narrative     Not on file        Past Medical History   Past Medical History:   Diagnosis Date     Acne     folliculitis     Plantar fasciitis, bilateral      Seasonal allergies      Tobacco abuse     quit smoking in 2019. 10 pack year history.     Patient Active Problem List   Diagnosis     Folliculitis     CARDIOVASCULAR SCREENING; LDL GOAL LESS THAN 130     Obesity     Acne vulgaris     Morbid obesity (H)     Axillary hidradenitis suppurativa     [unfilled]    Past Surgical History  He has a past surgical history that includes Ankle surgery (01/01/2008) and knee surgery (Left, 2003).     Examination   Ht 1.766 m (5' 9.52\")   Wt 121.1 kg (267 lb)   BMI 38.84 kg/m    GENERAL: Healthy, alert and no distress  EYES: Eyes grossly normal to inspection.  No discharge or " erythema, or obvious scleral/conjunctival abnormalities.  RESP: No audible wheeze, cough, or visible cyanosis.  No visible retractions or increased work of breathing.    SKIN: Visible skin clear. No significant rash, abnormal pigmentation or lesions.  NEURO: Cranial nerves grossly intact.  Mentation and speech appropriate for age.  PSYCH: Mentation appears normal, affect normal/bright, judgement and insight intact, normal speech and appearance well-groomed.         Counseling:   We reviewed the important post op bariatric recommendations:  -eating 3 meals daily  -eating protein first, getting >60gm protein daily  -eating slowly, chewing food well  -avoiding/limiting calorie containing beverages  -limiting starchy vegetables and carbohydrates, choosing wheat, not white with breads,   crackers, pastas, philippe, bagels, tortillas, rice  -limiting restaurant or cafeteria eating to twice a week or less    We discussed the importance of restorative sleep and stress management in maintaining a healthy weight.  We discussed the National Weight Control Registry healthy weight maintenance strategies and ways to optimize metabolism.  We discussed the importance of physical activity including cardiovascular and strength training in maintaining a healthier weight.    Total time spent on the date of this encounter doing: chart review, review of test results, patient visit, physical exam, education, counseling, developing plan of care and documenting = 34 minutes.      KARMEN Ding MD  [facility]

## 2021-03-01 NOTE — PATIENT INSTRUCTIONS
"WEIGHT MAINTENANCE STRATEGIES    According to the National Weight Control Registry there are several things that people who have lost weight and kept it off have in common. Some of them are...    1. 3 MEALS A DAY:  Make sure you are eating 3 meals each day.  No skipping meals.  80% of people who skip meals are overweight or obese.  Missing meals slows the metabolism, making it harder to maintain a healthy weight.    2. FOOD DIARY:  Much like keeping a ledger for your checkbook, keeping a food and exercise diary helps you \"keep track\" of the balance of energy (calories) in and energy out. It also helps you recognize potential unhealthy deviations from healthy patterns before they become habit. It's a nice way to monitor whether you are getting the protein, fiber, and other nutrients that your body needs.    3. FOLLOW-UP:  Studies show that those who follow up with their health professional regularly maintained their weight loss and those who are \"lost to follow-up \"are at risk for regain.  Moreover, it is essential to monitor vitamin levels with laboratory studies for life following gastric bypass surgery.     4.  HIGH FIBER/LOW FAT:  Lean sources of protein (skim milk, skinless, baked or broiled chicken breast, fish, etc.)  will help you meet your protein needs while fruits, vegetables, and whole grains will help you get the fiber that your body needs.  This is heart healthy eating and helps to keep calorie levels in balance.    5.  8,000 STEPS PER DAY:  This is a \"weight maintenance dose. \"  It is essential to get your steps in every day, 7 days a week.  You don't have to \"work out \" 7 days a week, but throughout the day, getting 8000 steps will help you maintain the weight you have lost.  Parking far away, taking the stairs instead of the elevator, and pacing while on the phone are some ways to help achieve this goal.    6.  Eat at home 90% OF THE TIME:  People who maintain a healthy weight eat at home, or meal " "prepared at home, 90% of the time.  Studies show that people consume an average of 770 rhonda when eating out at a restaurant and 440 rhonda when eating a meal prepared at home.  This equates to almost 35 pounds of excess weight for a person who eats out once a day for 1 year.      OTHER HELPFUL HABITS    -Minimizing liquid calories.  Skim milk is okay.  -Avoiding \"mindless \"eating, i.e., eating at the TV, and the car, in front of the computer.                        OPTIMIZING YOUR METABOLISM FOR LIFE    1.  MUSCLE MAINTENANCE: Muscle burns calories up to 70% better than fat test.  As we age her body composition changes. We lose muscle mass.  Weight training can help us keep and even build muscle mass.  Dumbbells, pushups, rubber band training, weight machines are all examples of ways to keep and/or build muscle mass.    2.  MOVE: 8000 steps daily has been shown to be a weight maintenance dose.  Aim for 10,000 steps each day. Helpfull habits include taking the stairs instead of the elevator when possible, parking at the far end of the parking lot, pacing while on the phone, and taking the dog for a walk.  Of course using a treadmill, stair climber, elliptical , and bicycle are all ways of getting 10,000 steps.    3.  3 MEALS EACH DAY: Make sure to get your 3 meals each day.  Skipping breakfast, working through your lunch, or not eating dinner will lead to a slowing of your metabolism.  Studies show that 80% of people who skip meals are overweight or obese.    4.  ADEQUATE PROTEIN INTAKE: Getting adequate protein is beneficial for a number of reasons: to aid the healing process, to blunt cravings immediately after eating and for a period of time after eating, to help keep blood sugars level, and to help you maintain your muscle mass.  See #1 above.      "

## 2021-05-24 ENCOUNTER — TELEPHONE (OUTPATIENT)
Dept: FAMILY MEDICINE | Facility: CLINIC | Age: 36
End: 2021-05-24

## 2021-05-25 ENCOUNTER — MYC MEDICAL ADVICE (OUTPATIENT)
Dept: SURGERY | Facility: CLINIC | Age: 36
End: 2021-05-25

## 2021-05-25 NOTE — CONFIDENTIAL NOTE
Patient left message this afternoon requesting refill on clindamycin 1% lotion axillary hidradenitis suppurativa. Also asks if there may be less expensive effective alternative as 60ml bottle of lotion cost him $80 when filled it last in January 2021.    Called patient to discuss but got voicemail. Left message informing him refills are available on his 1/11/21 rx for this med and if it is working for him and just now is running low on bottle he filled four months ago in January, he might want consider staying with this med and using [employer] colleen which has savings coupon to bring price down to $46 at [facility] or $36 at [facility].   If med is not working or this is still not affordable, advised call to discuss with nurse.  Nichole Malloy RN

## 2021-05-27 ENCOUNTER — TELEPHONE (OUTPATIENT)
Dept: SURGERY | Facility: CLINIC | Age: 36
End: 2021-05-27

## 2021-05-27 ENCOUNTER — VIRTUAL VISIT (OUTPATIENT)
Dept: SURGERY | Facility: CLINIC | Age: 36
End: 2021-05-27
Payer: COMMERCIAL

## 2021-05-27 VITALS — BODY MASS INDEX: 35.65 KG/M2 | HEART RATE: 72 BPM | WEIGHT: 249 LBS | HEIGHT: 70 IN

## 2021-05-27 DIAGNOSIS — E66.09 CLASS 2 OBESITY DUE TO EXCESS CALORIES WITH BODY MASS INDEX (BMI) OF 36.0 TO 36.9 IN ADULT, UNSPECIFIED WHETHER SERIOUS COMORBIDITY PRESENT: Primary | ICD-10-CM

## 2021-05-27 DIAGNOSIS — E66.812 CLASS 2 OBESITY DUE TO EXCESS CALORIES WITHOUT SERIOUS COMORBIDITY WITH BODY MASS INDEX (BMI) OF 36.0 TO 36.9 IN ADULT: Primary | ICD-10-CM

## 2021-05-27 DIAGNOSIS — E66.812 CLASS 2 OBESITY DUE TO EXCESS CALORIES WITH BODY MASS INDEX (BMI) OF 36.0 TO 36.9 IN ADULT, UNSPECIFIED WHETHER SERIOUS COMORBIDITY PRESENT: Primary | ICD-10-CM

## 2021-05-27 DIAGNOSIS — E66.09 CLASS 2 OBESITY DUE TO EXCESS CALORIES WITHOUT SERIOUS COMORBIDITY WITH BODY MASS INDEX (BMI) OF 36.0 TO 36.9 IN ADULT: Primary | ICD-10-CM

## 2021-05-27 PROBLEM — E66.01 MORBID OBESITY (H): Status: RESOLVED | Noted: 2021-01-11 | Resolved: 2021-05-27

## 2021-05-27 PROCEDURE — 99214 OFFICE O/P EST MOD 30 MIN: CPT | Mod: 95 | Performed by: PHYSICIAN ASSISTANT

## 2021-05-27 RX ORDER — PHENTERMINE HYDROCHLORIDE 15 MG/1
15 CAPSULE ORAL EVERY MORNING
Qty: 90 CAPSULE | Refills: 0 | Status: SHIPPED | OUTPATIENT
Start: 2021-05-27 | End: 2021-05-27

## 2021-05-27 RX ORDER — PHENTERMINE HYDROCHLORIDE 15 MG/1
15 CAPSULE ORAL EVERY MORNING
Qty: 90 CAPSULE | Refills: 0 | Status: SHIPPED | OUTPATIENT
Start: 2021-05-27 | End: 2021-11-28

## 2021-05-27 ASSESSMENT — MIFFLIN-ST. JEOR: SCORE: 2067.77

## 2021-05-27 NOTE — PATIENT INSTRUCTIONS
"Please get blood pressure and pulse checked 1-2 weeks after starting phentermine. If BP above 140/90 or pulse is greater than 100 .    MAINTAIN WEIGHT and OPTIMIZE YOUR METABOLISM     According to the National Weight Control Registry there are several things that people who have lost weight and kept it off have in common. Some of them are...    3 MEALS A DAY:  Make sure you are eating 3 meals each day.  Skipping breakfast, working through your lunch, or not eating dinner will lead to a slowing of your metabolism.  Studies show that 80% of people who skip meals are overweight or obese. Avoid \"mindless\" eating, i.e., eating at the TV, and the car, in front of the computer.    ADEQUATE PROTEIN INTAKE: Getting adequate protein is beneficial for a number of reasons: to aid the healing process, to blunt cravings immediately after eating and for a period of time after eating, to help keep blood sugars level, and to help you maintain your muscle mass.     HIGH FIBER/LOW FAT:  Lean sources of protein (skim milk, skinless, baked or broiled chicken breast, fish, etc.)  will help you meet your protein needs while fruits, vegetables, and whole grains will help you get the fiber that your body needs.  This is heart healthy eating and helps to keep calorie levels in balance.    FOOD DIARY:  Much like keeping a ledger for your checkbook, keeping a food and exercise diary helps you \"keep track\" of the balance of energy (calories) in and energy out. It also helps you recognize potential unhealthy deviations from healthy patterns before they become habit. It's a nice way to monitor whether you are getting the protein, fiber, and other nutrients that your body needs.    MOVE EVERY DAY:  It is essential to get your steps in every day, 7 days a week.  You don't have to \"work out \" 7 days a week, but throughout the day, getting 8000 steps will help you maintain the weight you have lost.  Parking far away, taking the stairs instead of the " "elevator, and pacing while on the phone are some ways to help achieve this goal.    MUSCLE MAINTENANCE: Muscle burns calories up to 70% better than fat.  As we age her body composition changes. We lose muscle mass.  Weight training can help us keep and even build muscle mass.  Dumbbells, pushups, rubber band training, weight machines are all examples of ways to keep and/or build muscle mass.    FOLLOW-UP:  Studies show that those who follow up with their health professional regularly maintained their weight loss and those who are \"lost to follow-up \"are at risk for regain.  Moreover, it is essential to monitor vitamin levels with laboratory studies for life following gastric bypass surgery.     EAT AT HOME:  People who maintain a healthy weight eat at home, or meal prepared at home, 90% of the time.  Studies show that people consume an average of 770 rhonda when eating out at a restaurant and 440 rhonda when eating a meal prepared at home.  This equates to almost 35 pounds of excess weight for a person who eats out once a day for 1 year.              "

## 2021-05-27 NOTE — TELEPHONE ENCOUNTER
Called pt re: phentermine refill. Pt preferred pharmacy is [facility] off [facility].    Called prescription below into [facility] pharmacy off [facility] in [place].        [facility] Pharmacist: Shauna Hirsch RN on 5/27/2021 at 11:29 AM

## 2021-05-27 NOTE — PROGRESS NOTES
Jeremy is a 35 year old who is being evaluated via a billable video visit.      If the video visit is dropped, the invitation should be resent by: Text to cell phone: [phone]  Will anyone else be joining your video visit? No      Video-Visit Details    Type of service:  Video Visit    Video Start Time: 9:34 AM    Video End Time:10:00 AM        Originating Location (pt. Location): Home    Distant Location (provider location):  [facility] [place]     Platform used for Video Visit: Obdulia Luna is a 35 year old who is being evaluated via a billable video visit.      If the video visit is dropped, the invitation should be resent by: Text to cell phone: [phone]  Will anyone else be joining your video visit? No      May 27, 2021      Return Medical Weight Management Note     Yamil Spencer  MRN:  [id number]  :  1985      Dear Kenzie Canada,    I had the pleasure of doing a virtual visit with your patient Yamil Spencer.  He is a 35 year old male who I am continuing to see for treatment of obesity related to:       2021   I have the following health issues associated with obesity: None of the above     INTERIM HISTORY:  Patient was started on 15 mg of phentermine approximately 6 weeks ago. He has lost 23 pounds! He is tolerating it well and feels that it not only helps to control his appetite it also helps his concentration. He has not been taking the medication on the weekends in order to get more sleep. He is no longer binging.    Stopped Phentermine in May. Noticed an increase in cravings at first.  Stuck with a plan of not binging and keeping with 1 .  Hasn't checked BP/pulse.  Would like to restart it in .      CURRENT WEIGHT (PATIENT REPORTED):  249 lbs 0 oz    Wt Readings from Last 4 Encounters:   21 249 lb (112.9 kg)   21 267 lb (121.1 kg)   21 290 lb (131.5 kg)   21 290 lb (131.5 kg)       BARIATRIC METRICS:  Current  Weight: 249 lb (112.9 kg)(pt reported)  Body mass index is 36.24 kg/m .   Wt change since last visit (lbs): -18  Cumulative weight loss (lbs): 41      OVERALL:  Positive Changes Since Last Visit: Continues to incorporate healthy eating and exercise most days of the week  Struggling With: none    WEIGHT LOSS MEDICATION:  Which weight loss medications are you currently taking? Phentermine 15 mg  How is it helpful? Less binging, hunger  Are you having any side effects? Less need for sleep      BP Readings from Last 6 Encounters:   01/11/21 126/82   12/18/15 116/64   10/25/13 110/70     DIETARY HISTORY  Meals Per Day: 3  Eating Protein First?: yes    Food Diary:   7:00 greek yogurt then 3 egg whites  and piece of fruit 2 hours later  12-1 PM:protein and fruit and sometimes vegetables sometimes low carb pasta or leftovers   3:00 Protein Shake   5:00 :protein, carb (fruit), and vegetable  7:30 Protein Shake    PHYSICAL ACTIVITY PATTERNS:  Cardiovascular: HIIT 45 min 5 days per week  Strength Training: HIIT 45 min 5 days per wee    REVIEW OF SYSTEMS  GENERAL/CONSTITUTIONAL:  Fatigue: no  HEENT:  Vision changes, glaucoma: no  CARDIOVASCULAR:  Chest Pain with Exertion: no  PULMONARY:  Dyspnea on exertion: no  NEUROLOGIC:  Paresthesias: no  PSYCHIATRIC:  Moods: stable  MUSCULOSKELETAL/RHEUMATOLOGIC  Arthralgias: no  Myalgias: no       MEDICATIONS:   Current Outpatient Medications   Medication     clindamycin (CLEOCIN T) 1 % external lotion     fluticasone (FLONASE) 50 MCG/ACT nasal spray     loratadine (CLARITIN) 5 MG chewable tablet     phentermine (ADIPEX-P) 15 MG capsule     No current facility-administered medications for this visit.        LABS:  Hemoglobin A1C   Date Value Ref Range Status   01/15/2021 5.2 0 - 5.6 % Final     Comment:     Normal <5.7% Prediabetes 5.7-6.4%  Diabetes 6.5% or higher - adopted from ADA   consensus guidelines.       Vitamin D Deficiency screening   Date Value Ref Range Status   01/15/2021 40  20 - 75 ug/L Final     Comment:     Season, race, dietary intake, and treatment affect the concentration of   25-hydroxy-Vitamin D. Values may decrease during winter months and increase   during summer months. Values 20-29 ug/L may indicate Vitamin D insufficiency   and values <20 ug/L may indicate Vitamin D deficiency.  Vitamin D determination is routinely performed by an immunoassay specific for   25 hydroxyvitamin D3.  If an individual is on vitamin D2 (ergocalciferol)   supplementation, please specify 25 OH vitamin D2 and D3 level determination by   LCMSMS test VITD23.       Sodium   Date Value Ref Range Status   01/15/2021 135 133 - 144 mmol/L Final     Potassium   Date Value Ref Range Status   01/15/2021 3.9 3.4 - 5.3 mmol/L Final     Chloride   Date Value Ref Range Status   01/15/2021 103 94 - 109 mmol/L Final     Carbon Dioxide   Date Value Ref Range Status   01/15/2021 27 20 - 32 mmol/L Final     Anion Gap   Date Value Ref Range Status   01/15/2021 5 3 - 14 mmol/L Final     Glucose   Date Value Ref Range Status   01/15/2021 80 70 - 99 mg/dL Final     Comment:     Non Fasting     Urea Nitrogen   Date Value Ref Range Status   01/15/2021 24 7 - 30 mg/dL Final     Creatinine   Date Value Ref Range Status   01/15/2021 0.82 0.66 - 1.25 mg/dL Final     GFR Estimate   Date Value Ref Range Status   01/15/2021 >90 >60 mL/min/[1.73_m2] Final     Comment:     Non  GFR Calc  Starting 12/18/2018, serum creatinine based estimated GFR (eGFR) will be   calculated using the Chronic Kidney Disease Epidemiology Collaboration   (CKD-EPI) equation.       Calcium   Date Value Ref Range Status   01/15/2021 8.9 8.5 - 10.1 mg/dL Final     Bilirubin Total   Date Value Ref Range Status   01/15/2021 0.5 0.2 - 1.3 mg/dL Final     Alkaline Phosphatase   Date Value Ref Range Status   01/15/2021 66 40 - 150 U/L Final     ALT   Date Value Ref Range Status   01/15/2021 39 0 - 70 U/L Final     AST   Date Value Ref Range Status  "  01/15/2021 41 0 - 45 U/L Final     Cholesterol   Date Value Ref Range Status   01/11/2021 159 100 - 199 mg/dL Final     HDL Cholesterol   Date Value Ref Range Status   01/11/2021 49 >39 mg/dL Final     LDL Cholesterol Calculated   Date Value Ref Range Status   01/11/2021 96 0 - 99 mg/dL Final     Triglycerides   Date Value Ref Range Status   01/11/2021 74 0 - 149 mg/dL Final        PE:  Ht 5' 9.5\" (1.765 m)   Wt 249 lb (112.9 kg)   BMI 36.24 kg/m    GENERAL: Healthy, alert and no distress  EYES: Eyes grossly normal to inspection.  No discharge or erythema, or obvious scleral/conjunctival abnormalities.  RESP: No audible wheeze, cough, or visible cyanosis.  No visible retractions or increased work of breathing.    SKIN: Visible skin clear. No significant rash, abnormal pigmentation or lesions.  NEURO: Cranial nerves grossly intact.  Mentation and speech appropriate for age.  PSYCH: Mentation appears normal, affect normal/bright, judgement and insight intact, normal speech and appearance well-groomed.    ASSESSMENT AND PLAN:        Class 2 obesity due to excess calories without serious comorbidity with body mass index (BMI) of 36.0 to 36.9 in adult  Patient was congratulated on weight loss success thus far. Healthy habits to assist with further weight loss were discussed. Jeremy will continue the phentermine.  He will restart it in June and take it on the days he works out at the gym.  He is wanting to get to 220 lbs by the end of summer and then work on maintaining his weight loss. He will continue his [employer] training and current dietary plan.    - phentermine (ADIPEX-P) 15 MG capsule; Take 1 capsule (15 mg) by mouth every morning    Patient will check blood pressure/pulse 7 days after starting.  If BP above 140/90 or pulse is greater than 100 will contact clinic.     Follow up: Return to clinic in 3 mo.    32 minutes spent on the date of the encounter doing chart review, history and exam, review test results, " counseling, developing plan of care, documentation, and further activities as noted above.

## 2021-06-01 ENCOUNTER — TELEPHONE (OUTPATIENT)
Dept: SURGERY | Facility: CLINIC | Age: 36
End: 2021-06-01

## 2021-06-01 NOTE — TELEPHONE ENCOUNTER
Prior Authorization Retail Medication Request    Medication/Dose: phentermine hcl 15 mg capsules  ICD code (if different than what is on RX):    Previously Tried and Failed:    Rationale:      Insurance Name:  medica  Insurance ID:  [id number]      Pharmacy Information (if different than what is on RX)  [facility]  Phone:  [phone]

## 2021-06-02 NOTE — TELEPHONE ENCOUNTER
PRIOR AUTHORIZATION DENIED    Medication: phentermine hcl 15 mg capsules - DENIED    Denial Date: 6/2/2021    Denial Rational:     Appeal Information:

## 2021-06-03 ENCOUNTER — TELEPHONE (OUTPATIENT)
Dept: SURGERY | Facility: CLINIC | Age: 36
End: 2021-06-03

## 2021-06-03 NOTE — TELEPHONE ENCOUNTER
Called pt re: phentermine prescription  No answer  Left VM to call clinic or refer to [employer] message.    Ophelia Hirsch RN on 6/3/2021 at 11:07 AM

## 2021-10-23 ENCOUNTER — HEALTH MAINTENANCE LETTER (OUTPATIENT)
Age: 36
End: 2021-10-23

## 2021-11-28 ENCOUNTER — MYC REFILL (OUTPATIENT)
Dept: SURGERY | Facility: CLINIC | Age: 36
End: 2021-11-28
Payer: COMMERCIAL

## 2021-11-28 DIAGNOSIS — E66.09 CLASS 2 OBESITY DUE TO EXCESS CALORIES WITH BODY MASS INDEX (BMI) OF 36.0 TO 36.9 IN ADULT, UNSPECIFIED WHETHER SERIOUS COMORBIDITY PRESENT: ICD-10-CM

## 2021-11-28 DIAGNOSIS — E66.812 CLASS 2 OBESITY DUE TO EXCESS CALORIES WITH BODY MASS INDEX (BMI) OF 36.0 TO 36.9 IN ADULT, UNSPECIFIED WHETHER SERIOUS COMORBIDITY PRESENT: ICD-10-CM

## 2021-11-30 RX ORDER — PHENTERMINE HYDROCHLORIDE 15 MG/1
15 CAPSULE ORAL EVERY MORNING
Qty: 90 CAPSULE | Refills: 0 | Status: SHIPPED | OUTPATIENT
Start: 2021-11-30 | End: 2022-04-11

## 2021-12-01 ENCOUNTER — TELEPHONE (OUTPATIENT)
Dept: SURGERY | Facility: CLINIC | Age: 36
End: 2021-12-01
Payer: COMMERCIAL

## 2021-12-01 NOTE — TELEPHONE ENCOUNTER
Prior Authorization Retail Medication Request    Medication/Dose: phentermine hci 15 mg capsules  ICD code (if different than what is on RX):    Previously Tried and Failed:    Rationale:      Insurance Name:  medica  Insurance ID:  [id number]       Pharmacy Information (if different than what is on RX)  [facility]  Phone:  [phone]

## 2021-12-01 NOTE — TELEPHONE ENCOUNTER
Central Prior Authorization Team   Phone: [phone]    PA Initiation    Medication: phentermine hci 15 mg capsules  Insurance Company: [employer] - Phone [phone] Fax [phone]  Pharmacy Filling the Rx: [id number] [place] [id number] AT [facility] [address]  Filling Pharmacy Phone: [phone]  Filling Pharmacy Fax:    Start Date: 12/1/2021

## 2021-12-03 ENCOUNTER — TELEPHONE (OUTPATIENT)
Dept: SURGERY | Facility: CLINIC | Age: 36
End: 2021-12-03
Payer: COMMERCIAL

## 2021-12-03 NOTE — TELEPHONE ENCOUNTER
Prior Authorization Retail Medication Request    Medication/Dose: phentermine hci 15 mg capsules  ICD code (if different than what is on RX):    Previously Tried and Failed:    Rationale:      Insurance Name:  medica  Insurance ID:  [id number]       Pharmacy Information (if different than what is on RX)  [facility]  Phone:  [phone]

## 2021-12-03 NOTE — TELEPHONE ENCOUNTER
Prior Authorization Approval    Authorization Effective Date: 11/1/2021  Authorization Expiration Date: 12/1/2022  Medication: phentermine hci 15 mg capsules  Approved Dose/Quantity:   Reference #:     Insurance Company: [employer] - Phone [phone] Fax [phone]  Expected CoPay:       CoPay Card Available:      Foundation Assistance Needed:    Which Pharmacy is filling the prescription (Not needed for infusion/clinic administered): [id number] [place] [id number] AT [facility]  Pharmacy Notified: Yes  Patient Notified: Yes

## 2021-12-03 NOTE — TELEPHONE ENCOUNTER
Duplicate request. See 12/01/2021 encounter- APPROVED.    Central Prior Authorization Team  Phone: [phone]

## 2022-02-12 ENCOUNTER — HEALTH MAINTENANCE LETTER (OUTPATIENT)
Age: 37
End: 2022-02-12

## 2022-03-02 ENCOUNTER — OFFICE VISIT (OUTPATIENT)
Dept: FAMILY MEDICINE | Facility: CLINIC | Age: 37
End: 2022-03-02

## 2022-03-02 VITALS
SYSTOLIC BLOOD PRESSURE: 117 MMHG | OXYGEN SATURATION: 97 % | TEMPERATURE: 97.8 F | HEART RATE: 65 BPM | WEIGHT: 234.8 LBS | HEIGHT: 69 IN | BODY MASS INDEX: 34.78 KG/M2 | DIASTOLIC BLOOD PRESSURE: 60 MMHG

## 2022-03-02 DIAGNOSIS — Z13.29 SCREENING FOR ENDOCRINE, METABOLIC AND IMMUNITY DISORDER: ICD-10-CM

## 2022-03-02 DIAGNOSIS — M25.551 HIP PAIN, RIGHT: ICD-10-CM

## 2022-03-02 DIAGNOSIS — Z00.00 ROUTINE GENERAL MEDICAL EXAMINATION AT A HEALTH CARE FACILITY: Primary | ICD-10-CM

## 2022-03-02 DIAGNOSIS — Z13.0 SCREENING FOR ENDOCRINE, METABOLIC AND IMMUNITY DISORDER: ICD-10-CM

## 2022-03-02 DIAGNOSIS — Z13.228 SCREENING FOR ENDOCRINE, METABOLIC AND IMMUNITY DISORDER: ICD-10-CM

## 2022-03-02 DIAGNOSIS — E66.09 CLASS 1 OBESITY DUE TO EXCESS CALORIES WITHOUT SERIOUS COMORBIDITY WITH BODY MASS INDEX (BMI) OF 34.0 TO 34.9 IN ADULT: ICD-10-CM

## 2022-03-02 DIAGNOSIS — Z13.220 SCREENING FOR LIPOID DISORDERS: ICD-10-CM

## 2022-03-02 DIAGNOSIS — M79.672 LEFT FOOT PAIN: ICD-10-CM

## 2022-03-02 DIAGNOSIS — E66.811 CLASS 1 OBESITY DUE TO EXCESS CALORIES WITHOUT SERIOUS COMORBIDITY WITH BODY MASS INDEX (BMI) OF 34.0 TO 34.9 IN ADULT: ICD-10-CM

## 2022-03-02 PROCEDURE — 99395 PREV VISIT EST AGE 18-39: CPT | Performed by: NURSE PRACTITIONER

## 2022-03-02 NOTE — PROGRESS NOTES
"3  SUBJECTIVE:   CC: Yamil Spencer is an 36 year old male who presents for preventive health visit.     Patient has been advised of split billing requirements and indicates understanding: Yes     #1) L heel pain: Report hx of plantar fasciitis but usually had been in the mid foot, has been present in the heel now for the past two months. Painful to put weight on it at the end of the day. Questions heel spur. Always bad in the mornings when he wakes up and at the end of the day. Best after work outs. Mid day starts to feel it more. By the end of the night it feels \"shot.\" Sometimes stretching can help, but sometimes it can be painful.     #2) R hip pain: Gradual atraumatic onset about 2-3 months ago. Comes and goes. Excruciating pain two days ago when he was doing a lot around the house. Localized to the lateral aspect and then shoots down the lateral right thigh. No urinary symptoms. Painful if he lays on it too long at night. Leg does not give way, denies mechanical symptoms.    #3) Obesity: Has lost 60 pounds since his last visit. Following with the medical weight management team. Saw nutritionist which was helpful, working on healthy diet. Exercising routinely. On phentermine as well which has helped with cravings and binge eating. Hx of ADHD as a child and feels like his symptoms have improved with phentermine.    Drinks a gallon of water daily at work    Has a physical therapist at work- told he's not strong enough in the left and compensating on the right side    Baby boy due 6/3/22- wife Ariana, pregnancy is going well    Healthy Habits:    Do you get at least three servings of calcium containing foods daily (dairy, green leafy vegetables, etc.)? yes    Amount of exercise or daily activities, outside of work: 5 day(s) per week    Problems taking medications regularly No    Medication side effects: No    Have you had an eye exam in the past two years? yes    Do you see a dentist twice per year? yes     Do " you have sleep apnea, excessive snoring or daytime drowsiness?no        Today's PHQ-2 Score:   PHQ-2 ( 1999 [employer]) 3/2/2022 1/11/2021   Q1: Little interest or pleasure in doing things 0 0   Q2: Feeling down, depressed or hopeless 0 0   PHQ-2 Score 0 0   PHQ-2 Total Score (12-17 Years)- Positive if 3 or more points; Administer PHQ-A if positive - 0     Abuse: Current or Past(Physical, Sexual or Emotional)- NO  Do you feel safe in your environment? YES    Have you ever done Advance Care Planning? (For example, a Health Directive, POLST, or a discussion with a medical provider or your loved ones about your wishes): No, advance care planning information given to patient to review.  Patient plans to discuss their wishes with loved ones or provider.      Social History     Tobacco Use     Smoking status: Former Smoker     Packs/day: 0.50     Years: 6.00     Pack years: 3.00     Types: Cigarettes     Smokeless tobacco: Never Used   Substance Use Topics     Alcohol use: Not Currently     If you drink alcohol do you typically have >3 drinks per day or >7 drinks per week? Not Applicable                      Last PSA: No results found for: PSA    Reviewed orders with patient. Reviewed health maintenance and updated orders accordingly - Yes  Lab work is in process  Labs reviewed in EPIC  BP Readings from Last 3 Encounters:   03/02/22 117/60   01/11/21 126/82   12/18/15 116/64    Wt Readings from Last 3 Encounters:   03/02/22 106.5 kg (234 lb 12.8 oz)   05/27/21 112.9 kg (249 lb)   03/01/21 121.1 kg (267 lb)                  Patient Active Problem List   Diagnosis     Folliculitis     CARDIOVASCULAR SCREENING; LDL GOAL LESS THAN 130     Class 1 obesity due to excess calories without serious comorbidity with body mass index (BMI) of 34.0 to 34.9 in adult     Acne vulgaris     Axillary hidradenitis suppurativa     Past Surgical History:   Procedure Laterality Date     ANKLE SURGERY  01/01/2008    fracture rt, ORIF plate and  screws     KNEE SURGERY Left 2003    chipped bone       Social History     Tobacco Use     Smoking status: Former Smoker     Packs/day: 0.50     Years: 6.00     Pack years: 3.00     Types: Cigarettes     Smokeless tobacco: Never Used   Substance Use Topics     Alcohol use: Not Currently     Family History   Problem Relation Age of Onset     Obesity Mother      Hypertension Mother      Heart Failure Father         passed away in his 60s, unclear cause     Diabetes Father      No Known Problems Sister      Coronary Artery Disease Maternal Grandmother         passed away in 80s     Alzheimer Disease Maternal Grandmother      Coronary Artery Disease Maternal Grandfather         passed away in 60s     Unexplained death Paternal Grandmother         passed away in 50s     Unexplained death Paternal Grandfather         passed away 60s-70s         Current Outpatient Medications   Medication Sig Dispense Refill     clindamycin (CLEOCIN T) 1 % external lotion Apply topically 2 times daily 60 mL 11     fluticasone (FLONASE) 50 MCG/ACT nasal spray 2 sprays       loratadine (CLARITIN) 5 MG chewable tablet Take 5 mg by mouth daily       phentermine (ADIPEX-P) 15 MG capsule Take 1 capsule (15 mg) by mouth every morning 90 capsule 0     No Known Allergies    Reviewed and updated as needed this visit by clinical staff   Tobacco  Allergies  Meds  Problems  Med Hx  Surg Hx  Fam Hx  Soc   Hx        Reviewed and updated as needed this visit by Provider   Tobacco  Allergies  Meds  Problems  Med Hx  Surg Hx  Fam Hx  Soc   Hx       Past Medical History:   Diagnosis Date     Acne     folliculitis     Plantar fasciitis, bilateral      Seasonal allergies      Tobacco abuse     quit smoking in 2019. 10 pack year history.      Past Surgical History:   Procedure Laterality Date     ANKLE SURGERY  01/01/2008    fracture rt, ORIF plate and screws     KNEE SURGERY Left 2003    chipped bone       ROS:  Gen, HEENT, CV, resp, GI, , MSK,  "heme/lymph, endo, skin, neuro, psych negative except as listed per HPI        OBJECTIVE:   Physical Exam:    /60   Pulse 65   Temp 97.8  F (36.6  C)   Ht 1.759 m (5' 9.25\")   Wt 106.5 kg (234 lb 12.8 oz)   SpO2 97%   BMI 34.42 kg/m      CONSTITUTIONAL: Alert non-toxic appearing male in no acute distress  HEAD: Normocephalic, atraumatic  EYES: PERRLA; no scleral icterus; sclera without injection  ENT: EACs clear bilaterally, TMs pearly gray and intact with good visualization of bony landmarks and cone of light, no bulging or erythema; nares patent without ulceration or edema; oropharynx pink without lesions; posterior pharynx without exudates  NECK: Neck supple without lymphadenopathy, thyroid without enlargement or nodularity  RESPIRATORY: Lungs clear to auscultation, respirations unlabored  CV: Regular rate and rhythm, S1S2, no clicks, murmurs, rubs, or gallops; bilateral lower extremities without edema, posterior tibialis pulses 2+ bilaterally  GASTROINTESTINAL: Normoactive bowel sounds x4 quadrants, abdomen soft, non-distended, and non-tender to palpation without masses or organomegaly  MSK: Tenderness over the R greater trochanter; bilateral hips with full ROM, no focal tenderness over ASIS; point tenderness to the pad of the left foot- no tenderness to Achilles insertion or the mid foot, full ROM and strength. Moves all extremities, no obvious muscle wasting.  NEUROLOGIC: No gross deficits, normal gait  SKIN: Appropriate color for race, warm and dry, no suspicious lesions or rashes  PSYCHIATRIC: Pleasant and interactive, affect euthymic, makes appropriate eye contact, thought process logical        Diagnostic Test Results:  Labs reviewed in Epic  No results found for this or any previous visit (from the past 24 hour(s)).    ASSESSMENT/PLAN:   Yamil was seen today for physical, musculoskeletal problem and immunization.    Diagnoses and all orders for this visit:    Routine general medical " "examination at a health care facility    Well appearing 36 year old male. See below for counseling.    Screening for lipoid disorders  -     Lipid Panel ([facility])    Screening for endocrine, metabolic and immunity disorder  -     Basic Metabolic Panel (8) ([facility])    Left foot pain  -     Physical Therapy Referral; Future    Suspect plantar fasciitis- reviewed NSAIDs, ice, stretching, supportive cares- refer to PT. Obtain imaging if symptoms fail to improve with PT.    Hip pain, right  -     Physical Therapy Referral; Future    Suspect trochanteric bursitis. Discussed NSAIDs, ice, avoiding of aggravating activities. Referral to PT. Consider steroid injection if symptoms fail to improve.    Class 1 obesity due to excess calories without serious comorbidity with body mass index (BMI) of 34.0 to 34.9 in adult    Jeremy has lost 60+ pounds since his last visit with me- commended on the outstanding progress he has made and his sustained lifestyle changes. Continues phentermine. We discussed different therapies for binge eating disorder. Discussed concomitant ADHD, to be seen if he would like to discuss this further.      Patient has been advised of split billing requirements and indicates understanding: Yes  COUNSELING:  Reviewed preventive health counseling, as reflected in patient instructions  Special attention given to:        Regular exercise       Healthy diet/nutrition       Immunizations    Declines influenza vaccine, reviewed risks    Estimated body mass index is 34.42 kg/m  as calculated from the following:    Height as of this encounter: 1.759 m (5' 9.25\").    Weight as of this encounter: 106.5 kg (234 lb 12.8 oz).    Weight management plan: Discussed healthy diet and exercise guidelines following with medical weight management Commended on the outstanding progress he has made.    He reports that he has quit smoking. His smoking use included cigarettes. He has a 3.00 pack-year smoking history. He has never used " smokeless tobacco.      Counseling Resources:  ATP IV Guidelines  Pooled Cohorts Equation Calculator  FRAX Risk Assessment  ICSI Preventive Guidelines  Dietary Guidelines for Americans, 2010  USDA's MyPlate  ASA Prophylaxis  Lung CA Screening    MARY Luo CNP  [facility]

## 2022-03-02 NOTE — PATIENT INSTRUCTIONS
Preventive Health Recommendations  Male Ages 26 - 39    Yearly exam:             See your health care provider every year in order to  o   Review health changes.   o   Discuss preventive care.    o   Review your medicines if your doctor has prescribed any.    You should be tested each year for STDs (sexually transmitted diseases), if you re at risk.     After age 35, talk to your provider about cholesterol testing. If you are at risk for heart disease, have your cholesterol tested at least every 5 years.     If you are at risk for diabetes, you should have a diabetes test (fasting glucose).  Shots: Get a flu shot each year. Get a tetanus shot every 10 years.     Nutrition:    Eat at least 5 servings of fruits and vegetables daily.     Eat whole-grain bread, whole-wheat pasta and brown rice instead of white grains and rice.     Get adequate Calcium and Vitamin D.     Lifestyle    Exercise for at least 150 minutes a week (30 minutes a day, 5 days a week). This will help you control your weight and prevent disease.     Limit alcohol to one drink per day.     No smoking.     Wear sunscreen to prevent skin cancer.     See your dentist every six months for an exam and cleaning.   Patient Education     Understanding Plantar Fasciitis    Plantar fasciitis is a condition that causes foot and heel pain. The plantar fascia is a tough band of tissue that runs across the bottom of the foot from the heel to the toes. This tissue pulls on the heel bone. It supports the arch of the foot as it pushes off the ground. If the tissue becomes irritated or red and swollen (inflamed), it is called plantar fasciitis.    How to say it  PLAN-tar fa-shee-EYE-[facility]   What causes plantar fasciitis?  Plantar fasciitis most often occurs from overusing the plantar fascia. The tissue may become damaged from activities that put repeated stress on the heel and foot. Or it may wear down over time with age and ankle stiffness. You are more likely to  have plantar fasciitis if you:     Do activities that require a lot of running, jumping, or dancing    Have new or increase activity    Have a job that requires being on your feet for long periods    Are overweight or obese    Have certain foot problems, such as a tight Achilles tendon, flat feet, or high arches    Often wear poorly fitting shoes  Symptoms of plantar fasciitis  The condition most often causes pain in the heel and the bottom of the foot. The pain may occur when you take your first steps in the morning. It may get better as you walk throughout the day. But as you continue to put weight on the foot, the pain often returns. Pain may also occur after standing or sitting for long periods.   Treating plantar fasciitis  Treatments for plantar fasciitis include:    Resting the foot. This involves limiting movements that make your foot hurt. You may also need to avoid certain sports and types of work for a time.    Using cold packs. Put an ice pack (wrapped in a thin towel) on the heel and foot to help reduce pain and swelling.    Taking medicines. Prescription and over-the-counter medicines can help relieve pain and swelling. NSAIDs (nonsteroidal anti-inflammatory drugs) are the most common medicines used. They may be given as pills. Or they may be put on the skin as a gel, cream, or patch.    Using heel cups or foot inserts (orthotics). These are placed in the shoes to help support the heel or arch and cushion the heel. You may also be advised to buy proper-fitting shoes with good arch support and cushioned soles.    Taping the foot. This supports the arch and limits the movement of the plantar fascia to help ease symptoms.    Wearing a night splint. This stretches the plantar fascia and leg muscles while you sleep. This may help ease pain.    Doing exercises and physical therapy. These stretch and strengthen the plantar fascia and the muscles in the leg that support the heel and foot. Stretching your calf  and plantar fascia is the most effective way to relieve pain.    Getting shots of medicine into the foot. These may help ease symptoms for a time. The shots often contain corticosteroids. These are strong anti-inflammatory medicines.    Having surgery. This may be needed if other treatments fail to relieve symptoms. During surgery, the surgeon may partially cut the plantar fascia to release tension.  Possible complications of plantar fasciitis  Without proper care and treatment, healing may take longer than normal. Also, symptoms may continue or get worse. Over time, the plantar fascia may be damaged. This can make it hard to walk or even stand without pain.   When to call your healthcare provider  Call your healthcare provider right away if you have any of these:    Fever of 100.4 F (38 C) or higher, or as directed by your provider    Chills    Symptoms that don t get better with treatment, or get worse    New symptoms, such as numbness, tingling, or weakness in the foot  Lindsay last reviewed this educational content on 6/1/2019 2000-2021 [employer]. All rights reserved. This information is not intended as a substitute for professional medical care. Always follow your healthcare professional's instructions.         Patient Education     Understanding Trochanteric Bursitis    A bursa is a thin, slippery, sac-like film. It contains a small amount of fluid. This structure is found between bones and soft tissues in and around joints. A bursa cushions and protects a joint. It keeps parts of a joint from rubbing against each other. If a bursa becomes inflamed and irritated, it's known as bursitis.   The trochanteric bursa is found on the hip joint. It lies on top of the bump at the top of the thighbone called the greater trochanter. Inflammation of this bursa is called trochanteric bursitis.   How to say it   [id number]  Causes of trochanteric bursitis  Causes may include:    Overuse of the hip  during running or other sports, dance, or work    Falling on or irritation to the side of the hip  This condition may occur along with other problems, such as osteoarthritis of the hip or knee, or low back problems. In rare cases, it may occur after hip surgery.   Symptoms of trochanteric bursitis    Pain or aching on the side of the hip. It's often felt as a sharp, intense pain. The pain may travel down the leg.    Swelling, tenderness, or warmth on the side of the hip at the bony bump at the top of the thigh    Treatment for trochanteric bursitis  These may include:    Resting the hip. This allows the bursa to heal.    Prescription or over-the-counter pain medicines. These help reduce inflammation, swelling, and pain. NSAIDs (nonsteroidal anti-inflammatory drugs) are the most common medicines used. Medicines may be prescribed or bought over the counter. They may be given as pills. Or they may be put on the skin as a gel, cream, or patch.    Cold packs and heat packs. These help reduce pain and swelling.    Stretching and strengthening exercises. These improve flexibility and strength around the hip.    Physical therapy. This includes exercises or other treatments.    Injections of medicine into the bursa.  This may help reduce inflammation and relieve symptoms. The medicine is usually a corticosteroid. This is a strong anti-inflammatory medicine.  Possible complications  If you don t give your hip time to heal, the problem may not go away, may return, or may get worse. Rest and treat your hip as directed.   When to call your healthcare provider  Call your healthcare provider right away if you have any of these:    Fever of 100.4 F (38 C) or higher, or as directed by your provider    Redness, swelling, or warmth that gets worse    Symptoms that don t get better with prescribed medicines, or get worse    New symptoms  Lindsay last reviewed this educational content on 6/1/2019 2000-2021 [employer].  All rights reserved. This information is not intended as a substitute for professional medical care. Always follow your healthcare professional's instructions.           Let's have you try scheduling ibuprofen 600mg three times daily if needed OR naproxen 440mg twice daily with food and water to decrease the inflammation    Ice the foot with the frozen can exercise we discussed, can try icing the hip too    Physical therapy referral placed    If symptoms persist or fail to improve, let me know- we could have an steroid injection in the hip or get an xray of the foot

## 2022-03-03 LAB
BUN SERPL-MCNC: 22 MG/DL (ref 6–20)
BUN/CREATININE RATIO: 31 (ref 9–20)
CALCIUM SERPL-MCNC: 9.3 MG/DL (ref 8.7–10.2)
CHLORIDE SERPLBLD-SCNC: 103 MMOL/L (ref 96–106)
CHOLEST SERPL-MCNC: 137 MG/DL (ref 100–199)
CREAT SERPL-MCNC: 0.71 MG/DL (ref 0.76–1.27)
EGFR: 123 ML/MIN/1.73
GLUCOSE SERPL-MCNC: 93 MG/DL (ref 65–99)
HDLC SERPL-MCNC: 55 MG/DL
LDL/HDL RATIO: 1.3 RATIO (ref 0–3.6)
LDLC SERPL CALC-MCNC: 71 MG/DL (ref 0–99)
POTASSIUM SERPL-SCNC: 4.9 MMOL/L (ref 3.5–5.2)
SODIUM SERPL-SCNC: 140 MMOL/L (ref 134–144)
TOTAL CO2: 26 MMOL/L (ref 20–29)
TRIGL SERPL-MCNC: 48 MG/DL (ref 0–149)
VLDLC SERPL CALC-MCNC: 11 MG/DL (ref 5–40)

## 2022-04-11 ENCOUNTER — VIRTUAL VISIT (OUTPATIENT)
Dept: SURGERY | Facility: CLINIC | Age: 37
End: 2022-04-11
Payer: COMMERCIAL

## 2022-04-11 VITALS — WEIGHT: 229 LBS | HEIGHT: 69 IN | BODY MASS INDEX: 33.92 KG/M2

## 2022-04-11 DIAGNOSIS — E66.09 CLASS 1 OBESITY DUE TO EXCESS CALORIES WITHOUT SERIOUS COMORBIDITY WITH BODY MASS INDEX (BMI) OF 33.0 TO 33.9 IN ADULT: Primary | ICD-10-CM

## 2022-04-11 DIAGNOSIS — E66.812 CLASS 2 OBESITY DUE TO EXCESS CALORIES WITH BODY MASS INDEX (BMI) OF 36.0 TO 36.9 IN ADULT, UNSPECIFIED WHETHER SERIOUS COMORBIDITY PRESENT: ICD-10-CM

## 2022-04-11 DIAGNOSIS — E66.09 CLASS 2 OBESITY DUE TO EXCESS CALORIES WITH BODY MASS INDEX (BMI) OF 36.0 TO 36.9 IN ADULT, UNSPECIFIED WHETHER SERIOUS COMORBIDITY PRESENT: ICD-10-CM

## 2022-04-11 DIAGNOSIS — E66.811 CLASS 1 OBESITY DUE TO EXCESS CALORIES WITHOUT SERIOUS COMORBIDITY WITH BODY MASS INDEX (BMI) OF 33.0 TO 33.9 IN ADULT: Primary | ICD-10-CM

## 2022-04-11 PROCEDURE — 99213 OFFICE O/P EST LOW 20 MIN: CPT | Mod: 95 | Performed by: PHYSICIAN ASSISTANT

## 2022-04-11 RX ORDER — PHENTERMINE HYDROCHLORIDE 15 MG/1
15 CAPSULE ORAL EVERY MORNING
Qty: 90 CAPSULE | Refills: 1 | Status: SHIPPED | OUTPATIENT
Start: 2022-04-11 | End: 2022-10-13 | Stop reason: SINTOL

## 2022-04-11 NOTE — PATIENT INSTRUCTIONS
Nice to talk with you today.  Below is our plan we discussed.-  BRYCE Souza    Plan:  Continue Phentermine 15 mg daily.   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    FOLLOW-UP:    Please call [phone] to schedule your next visit in 6 months.

## 2022-04-11 NOTE — PROGRESS NOTES
Jeremy is a 36 year old who is being evaluated via a billable video visit.      If the video visit is dropped, the invitation should be resent by: Text to cell phone: see chart  Will anyone else be joining your video visit? No      Video-Visit Details    Type of service:  Video Visit    Video Start Time: 3:03 PM    Video End Time:3:19 PM    Originating Location (pt. Location): Home    Distant Location (provider location):  [facility] [place]     Platform used for Video Visit: AmWell/then phone due to trouble hearing    2022    Return Medical Weight Management Note     Yamil Spencer  MRN:  [id number]  :  1985    Dear MARY Luo CNP,    I had the pleasure of seeing your patient Yamil Spencer. He is a 36 year old male who I am continuing to see for treatment of obesity related to:       2021   I have the following health issues associated with obesity: None of the above       Assessment & Plan   Problem List Items Addressed This Visit     Class 1 obesity due to excess calories without serious comorbidity with body mass index (BMI) of 34.0 to 34.9 in adult - Primary     Pt BP/Pulse WNL, hunger controlled  Responding well to Phentermine.  Weight Loss Percentage: 21.03%  Continue Phentermine 15 mg daily.   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           Relevant Medications    phentermine (ADIPEX-P) 15 MG capsule      Other Visit Diagnoses     Class 2 obesity due to excess calories with body mass index (BMI) of 36.0 to 36.9 in adult, unspecified whether serious comorbidity present        Relevant Medications    phentermine (ADIPEX-P) 15 MG capsule           PATIENT INSTRUCTIONS:  Continue Phentermine 15 mg daily.   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    FOLLOW-UP:    Please call [phone] to schedule your next visit in 6 months. n    24 minutes spent on the date of the encounter doing chart  review, history and exam, result review, counseling, developing plan of care, documentation, and further activities as noted      INTERVAL HISTORY:  Jeremy returns for medical weight management follow up.  Phentermine is working well. Hunger controlled. No Side effects. Takes 15 mg 5 days a week.  He has 3 pills left. Pts goal is to get to 225.  He is going to be starting yoga in addition to his NORAH training 5x a week.  Jeremy is expecting a baby in June.           WEIGHT METRICS:  Body mass index is 33.57 kg/m .   Current Weight: 229 lb (103.9 kg) (Pt reported)  Last Visits Weight: 249 lb (112.9 kg)  Initial Weight (lbs): 290 lbs  Cumulative weight loss (lbs): 61   Weight Loss Percentage: 21.03%    Wt Readings from Last 10 Encounters:   04/11/22 229 lb (103.9 kg)   03/02/22 234 lb 12.8 oz (106.5 kg)   05/27/21 249 lb (112.9 kg)   03/01/21 267 lb (121.1 kg)   01/12/21 290 lb (131.5 kg)   01/12/21 290 lb (131.5 kg)   01/11/21 290 lb (131.5 kg)   12/18/15 275 lb (124.7 kg)   10/25/13 258 lb (117 kg)        Weight Loss Medication History Reviewed With Patient 4/11/2022   Which weight loss medications are you currently taking on a regular basis?  Phentermine   Are you having any side effects from the weight loss medication that we have prescribed you? No       Changes and Difficulties 4/11/2022   I have made the following changes to my diet since my last visit: None   With regards to my diet, I am still struggling with: Nothing   I have made the following changes to my activity/exercise since my last visit: None   With regards to my activity/exercise, I am still struggling with: None     Was doing NORAH training 5x a week. Had some bursitis and is in PT so is decreasing his NORAH training and adding in yoga.      MEDICATIONS:   Current Outpatient Medications   Medication Sig Dispense Refill     clindamycin (CLEOCIN T) 1 % external lotion Apply topically 2 times daily 60 mL 11     fluticasone (FLONASE) 50 MCG/ACT nasal spray  2 sprays       loratadine (CLARITIN) 5 MG chewable tablet Take 5 mg by mouth daily       phentermine (ADIPEX-P) 15 MG capsule Take 1 capsule (15 mg) by mouth every morning 90 capsule 1       LABS:  Hemoglobin A1C POCT   Date Value Ref Range Status   01/15/2021 5.2 0 - 5.6 % Final     Comment:     Normal <5.7% Prediabetes 5.7-6.4%  Diabetes 6.5% or higher - adopted from ADA   consensus guidelines.       Vitamin D Deficiency screening   Date Value Ref Range Status   01/15/2021 40 20 - 75 ug/L Final     Comment:     Season, race, dietary intake, and treatment affect the concentration of   25-hydroxy-Vitamin D. Values may decrease during winter months and increase   during summer months. Values 20-29 ug/L may indicate Vitamin D insufficiency   and values <20 ug/L may indicate Vitamin D deficiency.  Vitamin D determination is routinely performed by an immunoassay specific for   25 hydroxyvitamin D3.  If an individual is on vitamin D2 (ergocalciferol)   supplementation, please specify 25 OH vitamin D2 and D3 level determination by   LCMSMS test [id number].       TSH   Date Value Ref Range Status   01/15/2021 0.97 0.40 - 4.00 mU/L Final     Sodium   Date Value Ref Range Status   03/02/2022 140 134 - 144 mmol/L Final     Potassium   Date Value Ref Range Status   03/02/2022 4.9 3.5 - 5.2 mmol/L Final     Chloride   Date Value Ref Range Status   03/02/2022 103 96 - 106 mmol/L Final     Carbon Dioxide   Date Value Ref Range Status   01/15/2021 27 20 - 32 mmol/L Final     Anion Gap   Date Value Ref Range Status   01/15/2021 5 3 - 14 mmol/L Final     Glucose   Date Value Ref Range Status   03/02/2022 93 65 - 99 mg/dL Final     Urea Nitrogen   Date Value Ref Range Status   03/02/2022 22 (H) 6 - 20 mg/dL Final     BUN/Creatinine Ratio   Date Value Ref Range Status   03/02/2022 31 (H) 9 - 20 Final     Creatinine   Date Value Ref Range Status   03/02/2022 0.71 (L) 0.76 - 1.27 mg/dL Final     GFR Estimate   Date Value Ref Range Status  "  01/15/2021 >90 >60 mL/min/[1.73_m2] Final     Comment:     Non  GFR Calc  Starting 12/18/2018, serum creatinine based estimated GFR (eGFR) will be   calculated using the Chronic Kidney Disease Epidemiology Collaboration   (CKD-EPI) equation.       Calcium   Date Value Ref Range Status   03/02/2022 9.3 8.7 - 10.2 mg/dL Final     Bilirubin Total   Date Value Ref Range Status   01/15/2021 0.5 0.2 - 1.3 mg/dL Final     Alkaline Phosphatase   Date Value Ref Range Status   01/15/2021 66 40 - 150 U/L Final     ALT   Date Value Ref Range Status   01/15/2021 39 0 - 70 U/L Final     AST   Date Value Ref Range Status   01/15/2021 41 0 - 45 U/L Final     Cholesterol   Date Value Ref Range Status   03/02/2022 137 100 - 199 mg/dL Final     HDL Cholesterol   Date Value Ref Range Status   03/02/2022 55 >39 mg/dL Final     LDL Cholesterol Calculated   Date Value Ref Range Status   03/02/2022 71 0 - 99 mg/dL Final     Triglycerides   Date Value Ref Range Status   03/02/2022 48 0 - 149 mg/dL Final     WBC   Date Value Ref Range Status   11/12/2007 7.4 4.0 - 11.0 10e9/L Final     Hemoglobin   Date Value Ref Range Status   11/12/2007 15.2 13.3 - 17.7 g/dL Final     Hematocrit   Date Value Ref Range Status   11/12/2007 44.7 40.0 - 53.0 % Final     MCV   Date Value Ref Range Status   11/12/2007 89 78 - 100 fl Final     Platelet Count   Date Value Ref Range Status   11/12/2007 151 150 - 450 10e9/L Final       BP Readings from Last 6 Encounters:   03/02/22 117/60   01/11/21 126/82   12/18/15 116/64   10/25/13 110/70     PE:  Ht 5' 9.25\" (1.759 m)   Wt 229 lb (103.9 kg)   BMI 33.57 kg/m    GENERAL: Healthy, alert and no distress  EYES: Eyes grossly normal to inspection.  No discharge or erythema, or obvious scleral/conjunctival abnormalities.  RESP: No audible wheeze, cough, or visible cyanosis.  No visible retractions or increased work of breathing.    SKIN: Visible skin clear. No significant rash, abnormal pigmentation " or lesions.  NEURO: Cranial nerves grossly intact.  Mentation and speech appropriate for age.  PSYCH: Mentation appears normal, affect normal/bright, judgement and insight intact, normal speech and appearance well-groomed.      Sincerely,      Libby Dorsey PA-C

## 2022-04-11 NOTE — ASSESSMENT & PLAN NOTE
Pt BP/Pulse WNL, hunger controlled  Responding well to Phentermine.  Weight Loss Percentage: 21.03%  Continue Phentermine 15 mg daily.   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

## 2022-07-08 ENCOUNTER — MYC MEDICAL ADVICE (OUTPATIENT)
Dept: FAMILY MEDICINE | Facility: CLINIC | Age: 37
End: 2022-07-08

## 2022-08-18 ENCOUNTER — TELEPHONE (OUTPATIENT)
Dept: SURGERY | Facility: CLINIC | Age: 37
End: 2022-08-18

## 2022-08-18 NOTE — TELEPHONE ENCOUNTER
Pt need phentermine refill, has 1 pill left, and now has 8/29 appt with Libby.    Pharm in orig script confirmed    [phone]  **OK to leave detailed VM

## 2022-08-18 NOTE — TELEPHONE ENCOUNTER
Spoke with pharmacy - there is 1 refill of phentermine available for the pt.     Called pt to update him on above. He did not answer. Left  to return call or read  msg that I sent.    Ophelia Hirsch RN

## 2022-10-07 NOTE — PROGRESS NOTES
Jeremy is a 37 year old who is being evaluated via a billable video visit.      If the video visit is dropped, the invitation should be resent by: Text to cell phone: [phone]  Will anyone else be joining your video visit? No      Video-Visit Details    Type of service:  Video Visit    Video Start Time: 9:33 AM    Video End Time:10:03    Originating Location (pt. Location): Home    Distant Location (provider location):  [facility] [place]     Platform used for Video Visit: Flickr    10/13/2022    Return Medical Weight Management Note     Yamil Spencer  MRN:  [id number]  :  1985    Dear MARY Luo CNP,    I had the pleasure of seeing your patient Yamil Spencer. He is a 37 year old male who I am continuing to see for treatment of obesity related to:       2021   I have the following health issues associated with obesity: None of the above       Assessment & Plan   Problem List Items Addressed This Visit     Class 1 obesity due to excess calories without serious comorbidity with body mass index (BMI) of 32.0 to 32.9 in adult - Primary     Patient was congratulated on wt loss success thus far. Healthy habits to assist with further weight loss were discussed. Jeremy will discontinue the phentermine.  Will start Vyvanse 30 mg qam. Risks/ benefits and possible side effects were discussed and questions were answered. Written information was given.              Relevant Medications    lisdexamfetamine (VYVANSE) 30 MG capsule    Binge eating    Relevant Medications    lisdexamfetamine (VYVANSE) 30 MG capsule        PATIENT INSTRUCTIONS:  Stop phentermine.   Start Vyvanse 30 mg in the morning.  Let me know after a few weeks how it is going through mychart.  We may need to increase the dose.      If you want to stay on it.  Please get blood pressure/pulse 7-10 days after starting.  If BP is above 140/90 or pulse is greater than 100 contact clinic. Can get BP  "Pulse checked at [facility] or through a  [facility]  Primary care office (nurse visit).     Below are the other 2 medications we discussed: topiramate and diethylpropion     FOLLOW-UP:    Please call [phone] to schedule your next visit in 8-12 weeks.     FOLLOW-UP:  Please call [phone] to schedule your next visit in    40 minutes spent on the date of the encounter doing chart review, history and exam, result review, counseling, developing plan of care, documentation, and further activities as noted      INTERVAL HISTORY:  Jeremy returns for medical weight management follow up.  Last seen in April 2022.  Was on Phentermine 15 mg daily. Responding well-Weight Loss Percentage: 21.03%  Was at 225# and then was up to 235.  Did a challenge at the gym and was able to lose back down to 224#.  Comes in today because his wife has noticed he has a short fuse when on the phentermine. Has been off the phentermine for 4 days and his mood was less \"sharp\".  However, hunger increased and he felt he was starting to want to binge.  Is looking for something to help lose his hunger.  When on phentermine Sleep is tough.  The minute he is up with his 4 mo old son he is wide awake.  Is taking melatonin prn.      Notices his biggest hunger times are at dinner and later. Has a hx of ADHD and was medicated as a child.  Did like to focus and energy Phentermine gave.     WEIGHT METRICS:  Body mass index is 32.84 kg/m .   Current Weight: 224 lb (101.6 kg)  Last Visits Weight: 229 lb (103.9 kg)  Initial Weight (lbs): 290 lbs  Cumulative weight loss (lbs): 66  Weight Loss Percentage: 22.76%    Wt Readings from Last 10 Encounters:   10/13/22 224 lb (101.6 kg)   04/11/22 229 lb (103.9 kg)   03/02/22 234 lb 12.8 oz (106.5 kg)   05/27/21 249 lb (112.9 kg)   03/01/21 267 lb (121.1 kg)   01/12/21 290 lb (131.5 kg)   01/12/21 290 lb (131.5 kg)   01/11/21 290 lb (131.5 kg)   12/18/15 275 lb (124.7 kg)   10/25/13 258 lb (117 kg)    "     Weight Loss Medication History Reviewed With Patient    Which weight loss medications are you currently taking on a regular basis?  Phentermine   Are you having any side effects from the weight loss medication that we have prescribed you? Irritability, Harder to get back to sleep       Changes and Difficulties    I have made the following changes to my diet since my last visit: None   With regards to my diet, I am still struggling with: Nighttime hunger when off the phentermine   I have made the following changes to my activity/exercise since my last visit: None   With regards to my activity/exercise, I am still struggling with: None         MEDICATIONS:   Current Outpatient Medications   Medication Sig Dispense Refill     clindamycin (CLEOCIN T) 1 % external lotion Apply topically 2 times daily 60 mL 11     fluticasone (FLONASE) 50 MCG/ACT nasal spray 2 sprays       lisdexamfetamine (VYVANSE) 30 MG capsule Take 1 capsule (30 mg) by mouth every morning 30 capsule 0     loratadine (CLARITIN) 5 MG chewable tablet Take 5 mg by mouth daily       phentermine (ADIPEX-P) 15 MG capsule Take 1 capsule (15 mg) by mouth every morning 90 capsule 1       LABS:  Hemoglobin A1C   Date Value Ref Range Status   01/15/2021 5.2 0 - 5.6 % Final     Comment:     Normal <5.7% Prediabetes 5.7-6.4%  Diabetes 6.5% or higher - adopted from ADA   consensus guidelines.       Vitamin D Deficiency screening   Date Value Ref Range Status   01/15/2021 40 20 - 75 ug/L Final     Comment:     Season, race, dietary intake, and treatment affect the concentration of   25-hydroxy-Vitamin D. Values may decrease during winter months and increase   during summer months. Values 20-29 ug/L may indicate Vitamin D insufficiency   and values <20 ug/L may indicate Vitamin D deficiency.  Vitamin D determination is routinely performed by an immunoassay specific for   25 hydroxyvitamin D3.  If an individual is on vitamin D2 (ergocalciferol)   supplementation,  please specify 25 OH vitamin D2 and D3 level determination by   LCMSMS test [id number].       TSH   Date Value Ref Range Status   01/15/2021 0.97 0.40 - 4.00 mU/L Final     Sodium   Date Value Ref Range Status   03/02/2022 140 134 - 144 mmol/L Final     Potassium   Date Value Ref Range Status   03/02/2022 4.9 3.5 - 5.2 mmol/L Final     Chloride   Date Value Ref Range Status   03/02/2022 103 96 - 106 mmol/L Final     Carbon Dioxide   Date Value Ref Range Status   01/15/2021 27 20 - 32 mmol/L Final     Anion Gap   Date Value Ref Range Status   01/15/2021 5 3 - 14 mmol/L Final     Glucose   Date Value Ref Range Status   03/02/2022 93 65 - 99 mg/dL Final     Urea Nitrogen   Date Value Ref Range Status   03/02/2022 22 (H) 6 - 20 mg/dL Final     BUN/Creatinine Ratio   Date Value Ref Range Status   03/02/2022 31 (H) 9 - 20 Final     Creatinine   Date Value Ref Range Status   03/02/2022 0.71 (L) 0.76 - 1.27 mg/dL Final     GFR Estimate   Date Value Ref Range Status   01/15/2021 >90 >60 mL/min/[1.73_m2] Final     Comment:     Non  GFR Calc  Starting 12/18/2018, serum creatinine based estimated GFR (eGFR) will be   calculated using the Chronic Kidney Disease Epidemiology Collaboration   (CKD-EPI) equation.       Calcium   Date Value Ref Range Status   03/02/2022 9.3 8.7 - 10.2 mg/dL Final     Bilirubin Total   Date Value Ref Range Status   01/15/2021 0.5 0.2 - 1.3 mg/dL Final     Alkaline Phosphatase   Date Value Ref Range Status   01/15/2021 66 40 - 150 U/L Final     ALT   Date Value Ref Range Status   01/15/2021 39 0 - 70 U/L Final     AST   Date Value Ref Range Status   01/15/2021 41 0 - 45 U/L Final     Cholesterol   Date Value Ref Range Status   03/02/2022 137 100 - 199 mg/dL Final     HDL Cholesterol   Date Value Ref Range Status   03/02/2022 55 >39 mg/dL Final     LDL Cholesterol Calculated   Date Value Ref Range Status   03/02/2022 71 0 - 99 mg/dL Final     Triglycerides   Date Value Ref Range Status  "  03/02/2022 48 0 - 149 mg/dL Final     WBC   Date Value Ref Range Status   11/12/2007 7.4 4.0 - 11.0 10e9/L Final     Hemoglobin   Date Value Ref Range Status   11/12/2007 15.2 13.3 - 17.7 g/dL Final     Hematocrit   Date Value Ref Range Status   11/12/2007 44.7 40.0 - 53.0 % Final     MCV   Date Value Ref Range Status   11/12/2007 89 78 - 100 fl Final     Platelet Count   Date Value Ref Range Status   11/12/2007 151 150 - 450 10e9/L Final         PE:  Ht 5' 9.25\" (1.759 m)   Wt 224 lb (101.6 kg)   BMI 32.84 kg/m    GENERAL: Healthy, alert and no distress  EYES: Eyes grossly normal to inspection.  No discharge or erythema, or obvious scleral/conjunctival abnormalities.  RESP: No audible wheeze, cough, or visible cyanosis.  No visible retractions or increased work of breathing.    SKIN: Visible skin clear. No significant rash, abnormal pigmentation or lesions.  NEURO: Cranial nerves grossly intact.  Mentation and speech appropriate for age.  PSYCH: Mentation appears normal, affect normal/bright, judgement and insight intact, normal speech and appearance well-groomed.      Sincerely,      Libby Dorsey PA-C        "

## 2022-10-10 ENCOUNTER — HEALTH MAINTENANCE LETTER (OUTPATIENT)
Age: 37
End: 2022-10-10

## 2022-10-13 ENCOUNTER — VIRTUAL VISIT (OUTPATIENT)
Dept: SURGERY | Facility: CLINIC | Age: 37
End: 2022-10-13
Payer: COMMERCIAL

## 2022-10-13 VITALS — HEIGHT: 69 IN | WEIGHT: 224 LBS | BODY MASS INDEX: 33.18 KG/M2

## 2022-10-13 DIAGNOSIS — R63.2 BINGE EATING: ICD-10-CM

## 2022-10-13 DIAGNOSIS — E66.09 CLASS 1 OBESITY DUE TO EXCESS CALORIES WITHOUT SERIOUS COMORBIDITY WITH BODY MASS INDEX (BMI) OF 32.0 TO 32.9 IN ADULT: Primary | ICD-10-CM

## 2022-10-13 DIAGNOSIS — E66.811 CLASS 1 OBESITY DUE TO EXCESS CALORIES WITHOUT SERIOUS COMORBIDITY WITH BODY MASS INDEX (BMI) OF 32.0 TO 32.9 IN ADULT: Primary | ICD-10-CM

## 2022-10-13 PROCEDURE — 99215 OFFICE O/P EST HI 40 MIN: CPT | Mod: 95 | Performed by: PHYSICIAN ASSISTANT

## 2022-10-13 RX ORDER — LISDEXAMFETAMINE DIMESYLATE 30 MG/1
30 CAPSULE ORAL EVERY MORNING
Qty: 30 CAPSULE | Refills: 0 | Status: SHIPPED | OUTPATIENT
Start: 2022-10-13 | End: 2022-11-29

## 2022-10-13 NOTE — PATIENT INSTRUCTIONS
"Nice to talk with you today. Thank you for allowing me the privilege of caring for you. We hope we provided you with the excellent service you deserve.     To ensure the quality of our services you may receive a patient satisfaction survey from an independent monitoring company.  The greatest compliment you can give is \"Likely to Recommend\"    Below is our plan we discussed.-  BRYCE Souza      Plan:  Stop phentermine.   Start Vyvanse 30 mg in the morning.  [facility] a few weeks [facility]  We may need to increase the dose.      If you want to stay on it.  Please get blood pressure/pulse 7-10 days after starting.  If BP is above 140/90 or pulse is greater than 100 contact clinic. Can get BP Pulse checked at and [facility] or through a [facility] care office (nurse visit).     Below are the other 2 medications we discussed: topiramate and diethylpropion     FOLLOW-UP:    Please call [phone] to schedule your next visit in 8-12 weeks.     VYVANSE    What is this drug used for?  It is used to treat attention deficit problems with hyperactivity.  It is used to treat binge eating disorder.  This drug is not approved for weight loss, but can help with weight management and has been used off label for this in patients with binge eating.     Instructions:  Take your dose 1st thing in the morning.     If you have been taking this drug for a long time or at high doses, it may not work as well and you may need higher doses to get the same effect. This is known as tolerance. Call your doctor if this drug stops working well. Do not take more than ordered.    Long-term or regular use of this drug may lead to dependence. Stopping this drug all of a sudden may lead to signs of withdrawal. Talk to your doctor before you lower the dose or stop this drug. You will need to follow your doctor's instructions. Tell your doctor if you have any bad effects.    Cardiac Side effects:  This " drug may cause high blood pressure or elevated pulse  Please get blood pressure/pulse 7-10 days after starting.  If BP is above 140/90 or pulse is greater than 100 contact clinic.     Side effects:   Anxiety, Constipation, Dry mouth, Feeling jittery, Weight loss, Trouble sleeping.    diethylpropion (TENUATE)    We are starting diethylpropion.  Our patients on diethylpropion find that they:    >feel less hunger    >find it easier to push the plate away   >have an easier time eating less    For some of our patients, these feelings are very real and immediate. For other patients, the feelings are less obvious. They don't feel much of a change but find they've lost weight. Like all weight loss medications, diethylpropion works best when you help it work. This means:  1. Having less tempting high calorie (fattening) food around the house or office. (For people with strong cravings this is very important.)   2. Staying away from situations or people that may trigger your cravings .   3. Eating out only one time or less each week.  4. Eating your meals at a table with the TV or computer off.    Dosing:  The medication is short acting and lasts about 4 hours.  For this reason it is often doses 3 times a day usually before meals.  If you have trouble with sleep when on the medication, you can drop back to twice daily or once daily dosing earlier in the day.     Side-effects. Diethylpropion is generally well tolerated. The most common side effects are dry mouth and constipation. Because it is a stimulant, pts can have feelings of racing pulse or rapid heart beat. Some people can get an elevated blood pressure. Because of this we ask you to get your blood pressure and pulse checked within 1-2 weeks of starting the medication.     For any questions or concerns please send a [employer] message to our team or call our weight management call center at [phone] during regular business hours. For questions during evenings or  weekends your messages will be addressed during the next business day.  For emergencies please call 911 or seek immediate medical care.      TOPIRAMATE (TOPAMAX)    Topiramate (Topamax) is a medication that is used most often to treat migraine headaches or for seizures. It has also been found to help with weight loss. Although it's not currently FDA approved for weight loss, it has been used safely for a number of years to help people who are carrying extra weight.     Just how topiramate helps with weight loss has not been exactly determined. However it seems to work on areas of the brain to quiet down signals related to eating.      Topiramate may make you:    >feel less interest in eating in between meals   >think less about food and eating   >find it easier to push the plate away   >find giving up pop easier    >have an easier time eating less    Instructions:  Week 1:Take 1 tablet (25 mg) at bedtime  Week 2 Take 2 tablets (50 mg) at bedtime  Week 3:Take 3 tablets (75 mg) at bedtime Stay at 3 tabs until you are seen again.     For some of our patients, the pills work right away. They feel and think quite differently about food. Other patients don't feel much of a change but find in fact they have lost weight! Like all weight loss medications, topiramate works best when you help it work.  This means:   1) Have less tempting high calorie (fattening) food around the house or office    2) Have lower calorie food (fruits, vegetables,low fat meats and dairy) for snacks    3) Eat out only one time or less each week.   4) Eat your meals at a table with the TV or computer off.    Side-effects Topiramate is generally well tolerated. The main side-effects we see are:   Tingling in hands,feet, or face (usually not very troublesome)   Mental confusion and word finding trouble (about 10% of patients have this.)     Feeling sleepy or a bit dopey- this goes away very soon after starting.    One of the dangers of topiramate is  the possibility of birth defects--if you get pregnant when you are on it, there is the risk that your baby will be born with a cleft lip or palate.  If you are on topiramate and of child bearing age, you need to be on a reliable form of birth control or refrain from sexual intercourse.     For any questions or concerns please send a [employer] message to our team or call our weight management call center at [phone] during regular business hours. For questions during evenings or weekends your messages will be addressed during the next business day.  For emergencies please call 911 or seek immediate medical care.     (Do not stop taking it if you don't think it's working. For some people it works even though they do not feel much different.)     In order to get refills of this or any medication we prescribe you must be seen in the medical weight mgmt clinic every 3-6 months. Please have your pharmacy fax a refill request to [phone]

## 2022-10-13 NOTE — ASSESSMENT & PLAN NOTE
Patient was congratulated on wt loss success thus far. Healthy habits to assist with further weight loss were discussed. Jeremy will discontinue the phentermine.  Will start Vyvanse 30 mg qam. Risks/ benefits and possible side effects were discussed and questions were answered. Written information was given.

## 2022-11-25 NOTE — PROGRESS NOTES
Jeremy is a 37 year old who is being evaluated via a billable video visit.      If the video visit is dropped, the invitation should be resent by: Text to cell phone: [phone]  Will anyone else be joining your video visit? No      Video-Visit Details    Type of service:  Video Visit    Video Start Time: 1:31 PM    Video End Time: 1:50c PM    Originating Location (pt. Location): Home    Distant Location (provider location):  [facility] [place]     Platform used for Video Visit: Obdulia    2022  Return Medical Weight Management Note     Yamil Spencer  MRN:  [id number]  :  1985    Dear MARY Carrizales CNP,    I had the pleasure of seeing your patient Yamil Spencer. He is a 37 year old male who I am continuing to see for treatment of obesity related to:       2021   I have the following health issues associated with obesity: None of the above       Assessment & Plan   Problem List Items Addressed This Visit     Class 1 obesity due to excess calories without serious comorbidity with body mass index (BMI) of 32.0 to 32.9 in adult - Primary     Patient was congratulated on wt loss success thus far. Healthy habits to assist with further weight loss were discussed. Jeremy will continue the Vyvanse but take it later in the morning to see if this helps with the evening binging/cravings. He will continue his current exercise and make a follow up appt with the dietician for January. He is down 20% from his baseline wt of 290 lbs.            Binge eating     Continue Vyvanse but take it later in the morning to see if this helps with the evening binging/cravings.                 PATIENT INSTRUCTIONS:  Continue Vyvanse but take it later in the morning to see if this helps with the evening binging/cravings.    Goals:  See aaron Bee in     FOLLOW-UP:    Please call [phone] to schedule your next visit in March.     FOLLOW-UP:  Please call  [phone] to schedule your next visit in    24 minutes spent on the date of the encounter doing chart review, history and exam, result review, counseling, developing plan of care, documentation, and further activities as noted      INTERVAL HISTORY:  Jeremy returns for medical weight management follow up.  Last seen October 13th.  We stopped Phentermine and Started Vyvanse.  Is on 30 mg and it is going well.  /70. 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 Notices at night he sometimes get hungrier and is craving.  Occasionally bindges or eats without thinking. Generally taking his meds at 5 am.      WEIGHT METRICS:  Body mass index is 32.84 kg/m .   Current Weight: 224 lb (101.6 kg) (pt reported)  Last Visits Weight: 224 lb (101.6 kg)  Initial Weight (lbs): 290 lbs  Cumulative weight loss (lbs): 66  Weight Loss Percentage: 22.76%    Wt Readings from Last 10 Encounters:   11/29/22 224 lb (101.6 kg)   10/13/22 224 lb (101.6 kg)   04/11/22 229 lb (103.9 kg)   03/02/22 234 lb 12.8 oz (106.5 kg)   05/27/21 249 lb (112.9 kg)   03/01/21 267 lb (121.1 kg)   01/12/21 290 lb (131.5 kg)   01/12/21 290 lb (131.5 kg)   01/11/21 290 lb (131.5 kg)   12/18/15 275 lb (124.7 kg)        Weight Loss Medication History Reviewed With Patient 11/29/2022   Which weight loss medications are you currently taking on a regular basis?  Vyvanse   Are you having any side effects from the weight loss medication that we have prescribed you? No       Changes and Difficulties 11/29/2022   I have made the following changes to my diet since my last visit: None   With regards to my diet, I am still struggling with: Binge eating at dinner or after dinner.   I have made the following changes to my activity/exercise since my last visit: None   With regards to my activity/exercise, I am still struggling with: None       MEDICATIONS:   Current Outpatient Medications   Medication Sig Dispense Refill     clindamycin (CLEOCIN T) 1 %  external lotion Apply topically 2 times daily 60 mL 11     fluticasone (FLONASE) 50 MCG/ACT nasal spray 2 sprays       lisdexamfetamine (VYVANSE) 30 MG capsule Take 1 capsule (30 mg) by mouth daily for 30 days 30 capsule 0     [START ON 12/23/2022] lisdexamfetamine (VYVANSE) 30 MG capsule Take 1 capsule (30 mg) by mouth daily for 30 days 30 capsule 0     [START ON 1/23/2023] lisdexamfetamine (VYVANSE) 30 MG capsule Take 1 capsule (30 mg) by mouth daily for 30 days 30 capsule 0     loratadine (CLARITIN) 5 MG chewable tablet Take 5 mg by mouth daily         LABS:  Hemoglobin A1C   Date Value Ref Range Status   01/15/2021 5.2 0 - 5.6 % Final     Comment:     Normal <5.7% Prediabetes 5.7-6.4%  Diabetes 6.5% or higher - adopted from ADA   consensus guidelines.       Vitamin D Deficiency screening   Date Value Ref Range Status   01/15/2021 40 20 - 75 ug/L Final     Comment:     Season, race, dietary intake, and treatment affect the concentration of   25-hydroxy-Vitamin D. Values may decrease during winter months and increase   during summer months. Values 20-29 ug/L may indicate Vitamin D insufficiency   and values <20 ug/L may indicate Vitamin D deficiency.  Vitamin D determination is routinely performed by an immunoassay specific for   25 hydroxyvitamin D3.  If an individual is on vitamin D2 (ergocalciferol)   supplementation, please specify 25 OH vitamin D2 and D3 level determination by   LCMSMS test [id number].       TSH   Date Value Ref Range Status   01/15/2021 0.97 0.40 - 4.00 mU/L Final     Sodium   Date Value Ref Range Status   03/02/2022 140 134 - 144 mmol/L Final     Potassium   Date Value Ref Range Status   03/02/2022 4.9 3.5 - 5.2 mmol/L Final     Chloride   Date Value Ref Range Status   03/02/2022 103 96 - 106 mmol/L Final     Carbon Dioxide   Date Value Ref Range Status   01/15/2021 27 20 - 32 mmol/L Final     Anion Gap   Date Value Ref Range Status   01/15/2021 5 3 - 14 mmol/L Final     Glucose   Date Value  Ref Range Status   03/02/2022 93 65 - 99 mg/dL Final     Urea Nitrogen   Date Value Ref Range Status   03/02/2022 22 (H) 6 - 20 mg/dL Final     BUN/Creatinine Ratio   Date Value Ref Range Status   03/02/2022 31 (H) 9 - 20 Final     Creatinine   Date Value Ref Range Status   03/02/2022 0.71 (L) 0.76 - 1.27 mg/dL Final     GFR Estimate   Date Value Ref Range Status   01/15/2021 >90 >60 mL/min/[1.73_m2] Final     Comment:     Non  GFR Calc  Starting 12/18/2018, serum creatinine based estimated GFR (eGFR) will be   calculated using the Chronic Kidney Disease Epidemiology Collaboration   (CKD-EPI) equation.       Calcium   Date Value Ref Range Status   03/02/2022 9.3 8.7 - 10.2 mg/dL Final     Bilirubin Total   Date Value Ref Range Status   01/15/2021 0.5 0.2 - 1.3 mg/dL Final     Alkaline Phosphatase   Date Value Ref Range Status   01/15/2021 66 40 - 150 U/L Final     ALT   Date Value Ref Range Status   01/15/2021 39 0 - 70 U/L Final     AST   Date Value Ref Range Status   01/15/2021 41 0 - 45 U/L Final     Cholesterol   Date Value Ref Range Status   03/02/2022 137 100 - 199 mg/dL Final     HDL Cholesterol   Date Value Ref Range Status   03/02/2022 55 >39 mg/dL Final     LDL Cholesterol Calculated   Date Value Ref Range Status   03/02/2022 71 0 - 99 mg/dL Final     Triglycerides   Date Value Ref Range Status   03/02/2022 48 0 - 149 mg/dL Final     WBC   Date Value Ref Range Status   11/12/2007 7.4 4.0 - 11.0 10e9/L Final     Hemoglobin   Date Value Ref Range Status   11/12/2007 15.2 13.3 - 17.7 g/dL Final     Hematocrit   Date Value Ref Range Status   11/12/2007 44.7 40.0 - 53.0 % Final     MCV   Date Value Ref Range Status   11/12/2007 89 78 - 100 fl Final     Platelet Count   Date Value Ref Range Status   11/12/2007 151 150 - 450 10e9/L Final         BP Readings from Last 6 Encounters:   03/02/22 117/60   01/11/21 126/82   12/18/15 116/64   10/25/13 110/70       Pulse Readings from Last 6 Encounters:  "  03/02/22 65   05/27/21 72   01/11/21 74   12/18/15 57   10/25/13 64       PE:  Ht 5' 9.25\" (1.759 m)   Wt 224 lb (101.6 kg)   BMI 32.84 kg/m    GENERAL: Healthy, alert and no distress  EYES: Eyes grossly normal to inspection.  No discharge or erythema, or obvious scleral/conjunctival abnormalities.  RESP: No audible wheeze, cough, or visible cyanosis.  No visible retractions or increased work of breathing.    SKIN: Visible skin clear. No significant rash, abnormal pigmentation or lesions.  NEURO: Cranial nerves grossly intact.  Mentation and speech appropriate for age.  PSYCH: Mentation appears normal, affect normal/bright, judgement and insight intact, normal speech and appearance well-groomed.      Sincerely,      Libby Dorsey PA-C        "

## 2022-11-29 ENCOUNTER — VIRTUAL VISIT (OUTPATIENT)
Dept: SURGERY | Facility: CLINIC | Age: 37
End: 2022-11-29
Payer: COMMERCIAL

## 2022-11-29 VITALS — BODY MASS INDEX: 33.18 KG/M2 | WEIGHT: 224 LBS | HEIGHT: 69 IN

## 2022-11-29 DIAGNOSIS — E66.09 CLASS 1 OBESITY DUE TO EXCESS CALORIES WITHOUT SERIOUS COMORBIDITY WITH BODY MASS INDEX (BMI) OF 32.0 TO 32.9 IN ADULT: Primary | ICD-10-CM

## 2022-11-29 DIAGNOSIS — R63.2 BINGE EATING: ICD-10-CM

## 2022-11-29 DIAGNOSIS — E66.811 CLASS 1 OBESITY DUE TO EXCESS CALORIES WITHOUT SERIOUS COMORBIDITY WITH BODY MASS INDEX (BMI) OF 32.0 TO 32.9 IN ADULT: Primary | ICD-10-CM

## 2022-11-29 PROCEDURE — 99213 OFFICE O/P EST LOW 20 MIN: CPT | Mod: GT | Performed by: PHYSICIAN ASSISTANT

## 2022-11-29 NOTE — PATIENT INSTRUCTIONS
"Nice to talk with you today. Thank you for allowing me the privilege of caring for you. We hope we provided you with the excellent service you deserve.     To ensure the quality of our services you may receive a patient satisfaction survey from an independent monitoring company.  The greatest compliment you can give is \"Likely to Recommend\"    Below is our plan we discussed.-  BRYCE Souza      Plan:  Continue Vyvanse but take it later in the morning to see if this helps with the evening binging/cravings.    Goals:  See aaron Bee in December/January    FOLLOW-UP:    Please call [phone] to schedule your next visit in March.     "

## 2022-11-29 NOTE — ASSESSMENT & PLAN NOTE
Continue Vyvanse but take it later in the morning to see if this helps with the evening binging/cravings.

## 2022-11-29 NOTE — ASSESSMENT & PLAN NOTE
Patient was congratulated on wt loss success thus far. Healthy habits to assist with further weight loss were discussed. Jeremy will continue the Vyvanse but take it later in the morning to see if this helps with the evening binging/cravings. He will continue his current exercise and make a follow up appt with the dietician for January. He is down 20% from his baseline wt of 290 lbs.

## 2022-12-08 ASSESSMENT — ANXIETY QUESTIONNAIRES
8. IF YOU CHECKED OFF ANY PROBLEMS, HOW DIFFICULT HAVE THESE MADE IT FOR YOU TO DO YOUR WORK, TAKE CARE OF THINGS AT HOME, OR GET ALONG WITH OTHER PEOPLE?: NOT DIFFICULT AT ALL
6. BECOMING EASILY ANNOYED OR IRRITABLE: MORE THAN HALF THE DAYS
3. WORRYING TOO MUCH ABOUT DIFFERENT THINGS: SEVERAL DAYS
IF YOU CHECKED OFF ANY PROBLEMS ON THIS QUESTIONNAIRE, HOW DIFFICULT HAVE THESE PROBLEMS MADE IT FOR YOU TO DO YOUR WORK, TAKE CARE OF THINGS AT HOME, OR GET ALONG WITH OTHER PEOPLE: NOT DIFFICULT AT ALL
7. FEELING AFRAID AS IF SOMETHING AWFUL MIGHT HAPPEN: NOT AT ALL
GAD7 TOTAL SCORE: 12
4. TROUBLE RELAXING: MORE THAN HALF THE DAYS
1. FEELING NERVOUS, ANXIOUS, OR ON EDGE: MORE THAN HALF THE DAYS
7. FEELING AFRAID AS IF SOMETHING AWFUL MIGHT HAPPEN: NOT AT ALL
GAD7 TOTAL SCORE: 12
2. NOT BEING ABLE TO STOP OR CONTROL WORRYING: MORE THAN HALF THE DAYS
GAD7 TOTAL SCORE: 12
5. BEING SO RESTLESS THAT IT IS HARD TO SIT STILL: NEARLY EVERY DAY

## 2022-12-09 ENCOUNTER — OFFICE VISIT (OUTPATIENT)
Dept: FAMILY MEDICINE | Facility: CLINIC | Age: 37
End: 2022-12-09

## 2022-12-09 VITALS
SYSTOLIC BLOOD PRESSURE: 123 MMHG | BODY MASS INDEX: 33.31 KG/M2 | HEART RATE: 95 BPM | DIASTOLIC BLOOD PRESSURE: 76 MMHG | OXYGEN SATURATION: 96 % | WEIGHT: 227.2 LBS

## 2022-12-09 DIAGNOSIS — Z23 NEED FOR IMMUNIZATION AGAINST TYPHOID: ICD-10-CM

## 2022-12-09 DIAGNOSIS — Z23 NEEDS FLU SHOT: ICD-10-CM

## 2022-12-09 DIAGNOSIS — F41.9 ANXIETY: Primary | ICD-10-CM

## 2022-12-09 DIAGNOSIS — Z71.84 TRAVEL ADVICE ENCOUNTER: ICD-10-CM

## 2022-12-09 DIAGNOSIS — R63.2 BINGE EATING: ICD-10-CM

## 2022-12-09 PROBLEM — Z91.09 ENVIRONMENTAL ALLERGIES: Status: ACTIVE | Noted: 2019-11-29

## 2022-12-09 PROCEDURE — 90472 IMMUNIZATION ADMIN EACH ADD: CPT | Mod: 59 | Performed by: NURSE PRACTITIONER

## 2022-12-09 PROCEDURE — 90674 CCIIV4 VAC NO PRSV 0.5 ML IM: CPT | Performed by: NURSE PRACTITIONER

## 2022-12-09 PROCEDURE — 99214 OFFICE O/P EST MOD 30 MIN: CPT | Mod: 25 | Performed by: NURSE PRACTITIONER

## 2022-12-09 PROCEDURE — 90471 IMMUNIZATION ADMIN: CPT | Mod: 59 | Performed by: NURSE PRACTITIONER

## 2022-12-09 PROCEDURE — 90691 TYPHOID VACCINE IM: CPT | Performed by: NURSE PRACTITIONER

## 2022-12-09 RX ORDER — HYDROXYZINE HYDROCHLORIDE 25 MG/1
TABLET, FILM COATED ORAL
Qty: 30 TABLET | Refills: 0 | Status: SHIPPED | OUTPATIENT
Start: 2022-12-09 | End: 2023-05-25

## 2022-12-09 NOTE — PATIENT INSTRUCTIONS
Yellow fever vaccine recommended - contact travel clinics (paper given at appt)    Hydroxyzine 1/2-1 tab on plane every 6 hours as needed for anxiety

## 2022-12-09 NOTE — PROGRESS NOTES
Problem(s) Oriented visit        SUBJECTIVE:                                                    Yamil Spencer is a 37 year old male who presents to clinic today for the following health issues :    Going to [place] with wife and baby in one week. Will be there for 2 weeks. Wondering what he needs for vaccinations. Wife says they will not be in area needing malaria prophylaxis.     Seeing provider at weight loss clinic. Started on vyvanse 30 mg daily last month to help with binge eating. Patient also thinks he has ADD/ADHD and wondering if this is a good option. Was on phentermine before this but it made him too jittery.    Problem list, Medication list, Allergies, and Medical/Social/Surgical histories reviewed in [email or web address] and updated as appropriate.   Additional history: as documented    ROS:  5 point ROS completed and negative except noted above, including Gen, CV, Resp, Neuro, Psych    OBJECTIVE:                                                    /76   Pulse 95   Wt 103.1 kg (227 lb 3.2 oz)   SpO2 96%   BMI 33.31 kg/m    Body mass index is 33.31 kg/m .   GENERAL: healthy, alert and no distress  RESP: calm regular breathing, no cough  CV: normal rate  PSYCH: normal affect & mood     ASSESSMENT/PLAN:                                                      Yamil was seen today for travel clinic and recheck medication.    Diagnoses and all orders for this visit:    Anxiety  -     hydrOXYzine (ATARAX) 25 MG tablet; Take 0.5-1 tab (12.5-25 mg) by mouth every 6 hours as needed for anxiety  Medication prescribed for long flights if anxiety/claustrophobia become a problem. Reviewed side effects of medications, alarm signs and symptoms, and when to seek further care.    Binge eating  Taking Vyvanse prescribed by weight specialist, which he is finding helpful. Down from 290 lbs January 2021.    Travel advice encounter  -     TYPHOID VACCINE, IM  We discussed the particular location of his travel and the current CDC  recommendations regarding immunizations, preventive care, food and water precautions and general travel advice.  Prescriptions as above.  He will consider rabies vaccination series.  He declines malaria prophylaxis and is at low risk given location of travel.  All questions answered if able. Will call travel clinic for yellow fever vaccine availability.    Need for immunization against typhoid  -     TYPHOID VACCINE, IM  -     VACCINE ADMINISTRATION, INITIAL    Needs flu shot  -     INFLUENZA,INJ,MDCK,PF,QUAD >6MO(FLUCELVAX)        See Patient Instructions  Patient Instructions   Yellow fever vaccine recommended - contact travel clinics (paper given at appt)    Hydroxyzine 1/2-1 tab on plane every 6 hours as needed for anxiety      MARY Carrizales CNP  [facility]  Family Practice  [facility]  [phone]    For any issues my office # is [phone]

## 2023-05-25 ENCOUNTER — OFFICE VISIT (OUTPATIENT)
Dept: FAMILY MEDICINE | Facility: CLINIC | Age: 38
End: 2023-05-25

## 2023-05-25 VITALS
DIASTOLIC BLOOD PRESSURE: 74 MMHG | HEART RATE: 64 BPM | OXYGEN SATURATION: 98 % | WEIGHT: 246 LBS | SYSTOLIC BLOOD PRESSURE: 127 MMHG | BODY MASS INDEX: 36.07 KG/M2

## 2023-05-25 DIAGNOSIS — J30.2 SEASONAL ALLERGIC RHINITIS, UNSPECIFIED TRIGGER: Primary | ICD-10-CM

## 2023-05-25 DIAGNOSIS — L73.2 AXILLARY HIDRADENITIS SUPPURATIVA: ICD-10-CM

## 2023-05-25 PROCEDURE — 99213 OFFICE O/P EST LOW 20 MIN: CPT | Performed by: FAMILY MEDICINE

## 2023-05-25 RX ORDER — CLINDAMYCIN PHOSPHATE 10 UG/ML
LOTION TOPICAL 2 TIMES DAILY
Qty: 60 ML | Refills: 11 | Status: SHIPPED | OUTPATIENT
Start: 2023-05-25 | End: 2023-06-21

## 2023-05-25 RX ORDER — MONTELUKAST SODIUM 10 MG/1
10 TABLET ORAL AT BEDTIME
Qty: 90 TABLET | Refills: 3 | Status: SHIPPED | OUTPATIENT
Start: 2023-05-25 | End: 2023-06-21

## 2023-05-25 NOTE — PROGRESS NOTES
SUBJECTIVE:    Yamil Spencer, is a 37 year old male presenting for the below:     1. 2 week h/o nasal congestion and nighttime cough with postnasal drip. Some throat clearing.  Gets allergies. Concurrent dry itchy eyes. Dog in the home : sleeps next to the bed.     Taking steroid nasal spray and Claritin. Last 7 days, added in robitussin and pseudoephedrine and musinex. All with minimal effect.    No prior h/o asthma or eczema. Allergy testing as a teenager : positive to dust mites, pet dander.     OBJECTIVE:  Vitals:    05/25/23 1524   BP: 127/74   Pulse: 64   SpO2: 98%   Weight: 111.6 kg (246 lb)    Body mass index is 36.07 kg/m .    General: no acute distress, cooperative with exam.  Eyes: no injection or drainage.  Ears: TM's and canals show no erythema, discharge, or effusion bilaterally.  Throat: moist mucous membranes, no tonsillar exudate or hypertrophy, posterior oral pharynx non-erythematous without lesions.  Nose : bogginess and congestion to nasal turbinates, left > right  Neck: supple, no thyroid nodules or enlargement.  Lungs: clear to auscultation bilaterally, normal respiratory effort.  Heart: regular rate, normal S1 and S2, no murmurs.     ASSESSMENT / PLAN:      Seasonal allergic rhinitis, unspecified trigger  Trial of montelukast. Would like to revisit allergy testing.   -     montelukast (SINGULAIR) 10 MG tablet; Take 1 tablet (10 mg) by mouth At Bedtime  -     Referral to [facility], [facility]    Axillary hidradenitis suppurativa  -     clindamycin (CLEOCIN T) 1 % external lotion; Apply topically 2 times daily

## 2023-05-27 ENCOUNTER — HEALTH MAINTENANCE LETTER (OUTPATIENT)
Age: 38
End: 2023-05-27

## 2023-06-14 ASSESSMENT — ENCOUNTER SYMPTOMS
ABDOMINAL PAIN: 0
CONSTIPATION: 0
NAUSEA: 0
NERVOUS/ANXIOUS: 0
HEMATOCHEZIA: 0
DIARRHEA: 0
SORE THROAT: 1
CHILLS: 0
EYE PAIN: 1
HEADACHES: 0
PALPITATIONS: 0
FEVER: 0
HEMATURIA: 0
ARTHRALGIAS: 1
DIZZINESS: 0
SHORTNESS OF BREATH: 0
MYALGIAS: 1
DYSURIA: 0
PARESTHESIAS: 0
FREQUENCY: 1
HEARTBURN: 0
COUGH: 1
WEAKNESS: 0

## 2023-06-20 NOTE — PROGRESS NOTES
"Male Preventative Health Visit     SUBJECTIVE:    This 38 year old male presents for a routine preventive physical exam.    The patient has the following concerns:     1. Obesity class 1. H/o binge eating disorder : has worked with a nutritionist for this and has regular therapist.  \"My problem has always been eating\". Would like to restart phentermine (stopped in the past as noticing some irritability, but endorses additional situational stress around that time which has now resolved). Regain of 20 lbs. Highest adult weight : 298 lbs.  HIIT training ( mix of cardio and weight training).    Answers for HPI/ROS submitted by the patient on 6/14/2023  Frequency of exercise:: 4-5 days/week  Getting at least 3 servings of Calcium per day:: Yes  Diet:: Low fat/cholesterol, Carbohydrate counting, Breakfast skipped  Duration of exercise:: 45-60 minutes    2. Seasonal allergies / allergic rhinitis. No prior h/o asthma or eczema. Allergy testing as a teenager : positive to dust mites, pet dander. Loratadine 10mg daily, nasal steroid spray.  Additional of montelukast 1 month ago with good effect.    3. Axillary hidradenitis suppurativa : clindamycin external lotion.      Patient's medications, allergies, past medical, surgical and family histories were reviewed and updated as appropriate.    Health Maintenance  Health maintenance alerts were reviewed and updated as appropriate.    OBJECTIVE:    Vitals:    06/21/23 0800   BP: 124/76   Pulse: 67   SpO2: 96%   Weight: 110 kg (242 lb 6.4 oz)   Height: 1.753 m (5' 9\")    Body mass index is 35.8 kg/m .  General: no acute distress, cooperative with exam.  HEENT:  PERRLA. Bilateral TM's, external canals, oropharynx normal.    Neck:  Supple, no lymphadenopathy or thyromegaly   Lungs: clear to auscultation bilaterally, normal respiratory effort.  Heart:  RRR without murmurs, rubs or gallops.  Normal S1 and S2.  Abdomen: normal bowel sounds, nontender, no palpable organomegaly.  Skin:  " No lesions.  No rashes.  Extremities: warm, perfused, no swelling or edema.  Neuro:  CN II-XII intact, motor & sensory function all intact.    Psych: mental status normal, mood and affect appropriate.        ASSESSMENT / PLAN: This 38 year old male presents for a routine preventive physical exam. Preventive health topics discussed including adequate exercise and healthy diet. Return to clinic in one year for preventative exam or sooner with any other concerns.  Other issues discussed as noted below.      Routine general medical examination at a health care facility    Axillary hidradenitis suppurativa  Stable.   -     clindamycin (CLEOCIN T) 1 % external lotion; Apply topically 2 times daily    Seasonal allergic rhinitis, unspecified trigger  Good effect with montelukast addition   -     montelukast (SINGULAIR) 10 MG tablet; Take 1 tablet (10 mg) by mouth At Bedtime    Class 1 obesity due to excess calories without serious comorbidity with body mass index (BMI) of 33.0 to 33.9 in adult  History of eating disorder  Offered medical weight management. Issued with intake form to complete. Will return with this for dedicated medical weight management first appointment.  -plan to perform baseline body composition analysis at initial appointment.   -restart phentermine : Mechanism of action, common side effects and how to take discussed.   -body composition completed today for starting reference : aim for reduction in adiposity with maintenance of muscle mass.     -     phentermine (ADIPEX-P) 15 MG capsule; Take 1 capsule (15 mg) by mouth every morning  -     Lipid Panel ([facility])  -     Hemoglobin A1C ([facility])  -     IA ESHA WHOLE BODY COMPOSITION ASSESSMENT W/I&R

## 2023-06-21 ENCOUNTER — OFFICE VISIT (OUTPATIENT)
Dept: FAMILY MEDICINE | Facility: CLINIC | Age: 38
End: 2023-06-21

## 2023-06-21 VITALS
DIASTOLIC BLOOD PRESSURE: 76 MMHG | HEART RATE: 67 BPM | SYSTOLIC BLOOD PRESSURE: 124 MMHG | BODY MASS INDEX: 35.9 KG/M2 | OXYGEN SATURATION: 96 % | WEIGHT: 242.4 LBS | HEIGHT: 69 IN

## 2023-06-21 DIAGNOSIS — J30.2 SEASONAL ALLERGIC RHINITIS, UNSPECIFIED TRIGGER: ICD-10-CM

## 2023-06-21 DIAGNOSIS — L73.2 AXILLARY HIDRADENITIS SUPPURATIVA: ICD-10-CM

## 2023-06-21 DIAGNOSIS — E66.811 CLASS 1 OBESITY DUE TO EXCESS CALORIES WITHOUT SERIOUS COMORBIDITY WITH BODY MASS INDEX (BMI) OF 33.0 TO 33.9 IN ADULT: ICD-10-CM

## 2023-06-21 DIAGNOSIS — Z00.00 ROUTINE GENERAL MEDICAL EXAMINATION AT A HEALTH CARE FACILITY: Primary | ICD-10-CM

## 2023-06-21 DIAGNOSIS — E66.09 CLASS 1 OBESITY DUE TO EXCESS CALORIES WITHOUT SERIOUS COMORBIDITY WITH BODY MASS INDEX (BMI) OF 33.0 TO 33.9 IN ADULT: ICD-10-CM

## 2023-06-21 DIAGNOSIS — Z86.59 HISTORY OF EATING DISORDER: ICD-10-CM

## 2023-06-21 PROCEDURE — 99395 PREV VISIT EST AGE 18-39: CPT | Performed by: FAMILY MEDICINE

## 2023-06-21 PROCEDURE — 99214 OFFICE O/P EST MOD 30 MIN: CPT | Mod: 25 | Performed by: FAMILY MEDICINE

## 2023-06-21 PROCEDURE — 0358T BIA WHOLE BODY: CPT | Performed by: FAMILY MEDICINE

## 2023-06-21 PROCEDURE — 36415 COLL VENOUS BLD VENIPUNCTURE: CPT | Performed by: FAMILY MEDICINE

## 2023-06-21 RX ORDER — CLINDAMYCIN PHOSPHATE 10 UG/ML
LOTION TOPICAL 2 TIMES DAILY
Qty: 60 ML | Refills: 11 | Status: SHIPPED | OUTPATIENT
Start: 2023-06-21 | End: 2024-07-19

## 2023-06-21 RX ORDER — MONTELUKAST SODIUM 10 MG/1
10 TABLET ORAL AT BEDTIME
Qty: 90 TABLET | Refills: 3 | Status: SHIPPED | OUTPATIENT
Start: 2023-06-21 | End: 2024-07-15

## 2023-06-21 RX ORDER — PHENTERMINE HYDROCHLORIDE 15 MG/1
15 CAPSULE ORAL EVERY MORNING
Qty: 90 CAPSULE | Refills: 0 | Status: SHIPPED | OUTPATIENT
Start: 2023-06-21 | End: 2023-10-12

## 2023-06-21 NOTE — PATIENT INSTRUCTIONS
Preventive Health Recommendations  Male Ages 26 - 39    Yearly exam:             See your health care provider every year in order to  o   Review health changes.   o   Discuss preventive care.    o   Review your medicines if your doctor has prescribed any.  You should be tested each year for STDs (sexually transmitted diseases), if you re at risk.   After age 35, talk to your provider about cholesterol testing. If you are at risk for heart disease, have your cholesterol tested at least every 5 years.   If you are at risk for diabetes, you should have a diabetes test (fasting glucose).  Shots: Get a flu shot each year. Get a tetanus shot every 10 years.     Nutrition:  Eat at least 5 servings of fruits and vegetables daily.   Eat whole-grain bread, whole-wheat pasta and brown rice instead of white grains and rice.   Get adequate Calcium and Vitamin D.     Lifestyle  Exercise for at least 150 minutes a week (30 minutes a day, 5 days a week). This will help you control your weight and prevent disease.   Limit alcohol to one drink per day.   No smoking.   Wear sunscreen to prevent skin cancer.   See your dentist every six months for an exam and cleaning.     -Get a medical weight management intake form from the   -Lets start phentermine 15 mg daily  -follow up in 1 month

## 2023-06-22 LAB
CHOLEST SERPL-MCNC: 165 MG/DL (ref 100–199)
HBA1C MFR BLD: 5.2 % (ref 4.8–5.6)
HDLC SERPL-MCNC: 59 MG/DL
LDL/HDL RATIO: 1.6 RATIO (ref 0–3.6)
LDLC SERPL CALC-MCNC: 94 MG/DL (ref 0–99)
TRIGL SERPL-MCNC: 60 MG/DL (ref 0–149)
VLDLC SERPL CALC-MCNC: 12 MG/DL (ref 5–40)

## 2023-06-23 ENCOUNTER — DOCUMENTATION ONLY (OUTPATIENT)
Dept: FAMILY MEDICINE | Facility: CLINIC | Age: 38
End: 2023-06-23

## 2023-07-10 NOTE — PROGRESS NOTES
Received fax from [facility].  They have denied the request for phentermine.    Samson Justice,   [facility]  [phone]

## 2023-07-26 ENCOUNTER — OFFICE VISIT (OUTPATIENT)
Dept: FAMILY MEDICINE | Facility: CLINIC | Age: 38
End: 2023-07-26

## 2023-07-26 VITALS
HEART RATE: 71 BPM | WEIGHT: 242.8 LBS | DIASTOLIC BLOOD PRESSURE: 65 MMHG | SYSTOLIC BLOOD PRESSURE: 97 MMHG | OXYGEN SATURATION: 99 % | BODY MASS INDEX: 35.86 KG/M2

## 2023-07-26 DIAGNOSIS — L72.0 MILIAL CYST: ICD-10-CM

## 2023-07-26 DIAGNOSIS — L73.2 AXILLARY HIDRADENITIS SUPPURATIVA: Primary | ICD-10-CM

## 2023-07-26 DIAGNOSIS — E66.09 CLASS 1 OBESITY DUE TO EXCESS CALORIES WITHOUT SERIOUS COMORBIDITY WITH BODY MASS INDEX (BMI) OF 33.0 TO 33.9 IN ADULT: ICD-10-CM

## 2023-07-26 DIAGNOSIS — E66.811 CLASS 1 OBESITY DUE TO EXCESS CALORIES WITHOUT SERIOUS COMORBIDITY WITH BODY MASS INDEX (BMI) OF 33.0 TO 33.9 IN ADULT: ICD-10-CM

## 2023-07-26 DIAGNOSIS — R63.2 BINGE EATING: ICD-10-CM

## 2023-07-26 PROCEDURE — 17110 DESTRUCTION B9 LES UP TO 14: CPT | Performed by: FAMILY MEDICINE

## 2023-07-26 PROCEDURE — 99214 OFFICE O/P EST MOD 30 MIN: CPT | Mod: 25 | Performed by: FAMILY MEDICINE

## 2023-07-26 NOTE — PROGRESS NOTES
Procedure: milia cyst extraction.     Location : medial epicanthic fold / just medial to tear duct of right eye.    Verbal consent obtained.     11 blade used to make small incision. Comedone extractor used to express cyst. Patient tolerated procedure well.     Milial cyst  -     DESTRUCT BENIGN LESION, UP TO 14

## 2023-07-26 NOTE — PROGRESS NOTES
Primary Care Weight Management                                                          INITIAL ASSESSMENT      HPI  Yamil Spencer is a 38 year old male being seen today for metabolic health and weight management assessment with the following weight related health issues : hidradenitis.     Weight history is as follows:  Obesity class 1. H/o binge eating disorder : has worked with a nutritionist for this and has regular therapist.      Prior success with phentermine (stopped in the past as noticing some irritability, but endorses additional situational stress around that time which has now resolved).     Restarted taking 6/21/2023 : phentermine 15 mg daily. No side effects of irritability this time. Finds assists with ADHD symptoms.     Previously was tracking food, not drinking alcohol, being more diligent about physical activity and had more routine and structure to physical activity when taking phentermine. Open to adding these elements in again.      Plans to track macronutrient and aim for 1800 Kcal per day with additional calories added for work out (managed this through an colleen).    Highest adult weight: 301 lbs  Lowest adult weight: 205 lbs  Ideal weight: 201 lbs    Current exercise:    Days per week: 4 to 5  Type of exercise and duration:   HIIT training ( mix of cardio and weight training).     Eating behaviors:   Binging: present  Grazing:absent  Eating after dinner:present  Waking at night to eat:absent  Emotional eating: present  Carbohydrate craving: present  Experiencing constant hunger: absent      PHYSICAL EXAMINATION:  Vitals: BP 97/65   Pulse 71   Wt 110.1 kg (242 lb 12.8 oz)   SpO2 99%   BMI 35.86 kg/m    General: no acute distress, cooperative with exam.  Psych: mental status normal, mood and affect appropriate.    ASSESSMENT/PLAN:       Class 1 obesity due to excess calories without serious comorbidity with body mass index (BMI) of 33.0 to 33.9 in adult  Axillary hidradenitis  suppurativa  Binge eating  -     Nutrition Referral; Future    -Agreed together to treat obesity-associated medical conditions and conditions exacerbated by or contributing to weight gain by medical management of weight:  -Discussed the need to address all four pillars of medical weight management (nutrition, physical activity, behavior and medication) going forward for best chances of success in sustained weight loss  -Discussed obesity as being a chronic, relapsing, multifactorial condition with a neurohormonal basis and that medical management should be though of as a long term treatment.  -Managed expectations of weight loss and discussed aiming for sustained loss.  -An understanding of the necessity for commitment to life-long follow-up, lifestyle changes and dietary modification required for long-term success was also verbalized.      Plan for management includes the following:  -Nutrition: Plans to track macronutrient and aim for 1800 Kcal per day with additional calories added for work out (managed this through an colleen). Interested in referral to RD to support nutritional change.   -Exercise: adequate.   -Medications: continue phentermine 15 mg daily.   -Behavior: H/o binge eating disorder : has worked with a nutritionist for this and has regular therapist.           Follow up with me in 4-8 weeks.     Time spent doing chart review, history and exam, documentation and further activities per the note : 32 minutes

## 2023-07-26 NOTE — PATIENT INSTRUCTIONS
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

## 2023-10-12 DIAGNOSIS — E66.09 CLASS 1 OBESITY DUE TO EXCESS CALORIES WITHOUT SERIOUS COMORBIDITY WITH BODY MASS INDEX (BMI) OF 33.0 TO 33.9 IN ADULT: ICD-10-CM

## 2023-10-12 DIAGNOSIS — E66.811 CLASS 1 OBESITY DUE TO EXCESS CALORIES WITHOUT SERIOUS COMORBIDITY WITH BODY MASS INDEX (BMI) OF 33.0 TO 33.9 IN ADULT: ICD-10-CM

## 2023-10-13 RX ORDER — PHENTERMINE HYDROCHLORIDE 15 MG/1
15 CAPSULE ORAL EVERY MORNING
Qty: 90 CAPSULE | Refills: 0 | Status: SHIPPED | OUTPATIENT
Start: 2023-10-13 | End: 2024-07-15 | Stop reason: ALTCHOICE

## 2023-10-13 NOTE — CONFIDENTIAL NOTE
CONTROLLED SUBSTANCE REFILL REQUEST FOR PHENTERMINE  DOSE: 15 MG - # 90  SI CAP EVERY MORNING      LAST REFILL: 23    LAST APPT RELATED: 23    NEXT APPT: NONE      CONTROLLED SUBSTANCE AGREEMENT: DUE    URINE DRUG SCREEN: DUE        FILL HISTORY PER :          REFILL ROUTED TO DR PATTERSON FOR REVIEW    Mary Ellen Marie CMA      OVERDUE FOR FOLLOW UP (MWM)

## 2024-03-01 ENCOUNTER — OFFICE VISIT (OUTPATIENT)
Dept: FAMILY MEDICINE | Facility: CLINIC | Age: 39
End: 2024-03-01

## 2024-03-01 VITALS
HEART RATE: 72 BPM | DIASTOLIC BLOOD PRESSURE: 78 MMHG | BODY MASS INDEX: 38.45 KG/M2 | WEIGHT: 259.6 LBS | SYSTOLIC BLOOD PRESSURE: 128 MMHG | HEIGHT: 69 IN | OXYGEN SATURATION: 99 %

## 2024-03-01 DIAGNOSIS — R09.81 CHRONIC NASAL CONGESTION: ICD-10-CM

## 2024-03-01 DIAGNOSIS — J01.20 ACUTE NON-RECURRENT ETHMOIDAL SINUSITIS: Primary | ICD-10-CM

## 2024-03-01 DIAGNOSIS — J30.9 ALLERGIC RHINITIS, UNSPECIFIED SEASONALITY, UNSPECIFIED TRIGGER: ICD-10-CM

## 2024-03-01 PROCEDURE — 99214 OFFICE O/P EST MOD 30 MIN: CPT | Performed by: FAMILY MEDICINE

## 2024-03-01 RX ORDER — MOMETASONE FUROATE MONOHYDRATE 50 UG/1
SPRAY, METERED NASAL
COMMUNITY
Start: 2024-02-12

## 2024-03-01 RX ORDER — AZELASTINE HYDROCHLORIDE 0.5 MG/ML
SOLUTION/ DROPS OPHTHALMIC
COMMUNITY
Start: 2024-02-12

## 2024-03-01 RX ORDER — AZELASTINE 1 MG/ML
2 SPRAY, METERED NASAL 2 TIMES DAILY
Qty: 30 ML | Refills: 1 | Status: SHIPPED | OUTPATIENT
Start: 2024-03-01

## 2024-03-01 NOTE — TELEPHONE ENCOUNTER
"FUTURE VISIT INFORMATION      FUTURE VISIT INFORMATION:  Date: 5/1/24  Time: 1pm  Location: [place]  REFERRAL INFORMATION:  Referring provider:  Lakhwinder Thurman MD   Referring providers clinic:  [facility]  Reason for visit/diagnosis  Per Pt, dx rhinitis and chronic nasal congestion referral from sheri Cruz in epic     RECORDS REQUESTED FROM:       Clinic name Comments Records Status Imaging Status   [facility]  3/1/24- OV Lakhwinder Thurman MD  [facility] 11/29/19- Ov Jessie Gerber MD   CE            \"Please notify/message CSS if patient completed outside imaging prior to scheduled appointment and/or any outside records that might have been missed at pre visit -Thank you\"  "
No

## 2024-03-01 NOTE — PROGRESS NOTES
Subjective      Chief Complaint: chief complaint    History of Present Illness:  Yamil Spencer is a 38 year old male here for evaluation.  Reports long standing issues with allergies and nasal congestion.  Feels there was a worsening months ago.  He was started on montelukast.  Also on nasonex and claritin.  Has chronic daily symptoms and is undergoing allergy shots.  Past couple weeks increased drainage and nasal pressure.  No fever or chills.  Using neti pot.  Not improving.      Past Medical History:   Patient Active Problem List   Diagnosis    Class 1 obesity due to excess calories without serious comorbidity with body mass index (BMI) of 33.0 to 33.9 in adult    Axillary hidradenitis suppurativa    Binge eating    Environmental allergies         Adverse Drug Reactions: Patient has no known allergies.      Medications: amoxicillin-clavulanate, azelastine, clindamycin, loratadine, mometasone, montelukast, and phentermine       Family History:   Family History   Problem Relation Age of Onset    Obesity Mother     Hypertension Mother     Heart Failure Father         passed away in his 60s, unclear cause    Diabetes Father     No Known Problems Sister     Coronary Artery Disease Maternal Grandmother         passed away in 80s    Alzheimer Disease Maternal Grandmother     Coronary Artery Disease Maternal Grandfather         passed away in 60s    Unexplained death Paternal Grandmother         passed away in 50s    Unexplained death Paternal Grandfather         passed away 60s-70s        Social History:   Social History     Tobacco Use    Smoking status: Former     Packs/day: 0.50     Years: 6.00     Additional pack years: 0.00     Total pack years: 3.00     Types: Cigarettes    Smokeless tobacco: Never   Substance Use Topics    Alcohol use: Not Currently    Drug use: No           Review of Systems:  Negative except per HPI.    Objective     Physical Exam:  Vital Signs: /78   Pulse 72   Ht 1.753 m (5'  "9\")   Wt 117.8 kg (259 lb 9.6 oz)   SpO2 99%   BMI 38.34 kg/m         General:  Mildly ill appearing and in NAD.  Skin:  Clear  Eyes:  Clear without injection  Nose:  Congested, turbinates swollen    Sinuses: ethmoid are tender to percussion.  No obvious swelling.  Ears:  Canals normal without lesions.  TMs:clear  Pharynx: Moist mucous membranes without lesions, erythema, or exudate  Neck: Supple with shotty anterior and posterior lymphadenopathy  Respiratory:  Normal respiratory effort.  Lungs are clear with good breath sounds.    Heart: RR without murmurs, rubs, or gallops.          Assessment     Impression:  1. Acute non-recurrent ethmoidal sinusitis    2. Allergic rhinitis, unspecified seasonality, unspecified trigger    3. Chronic nasal congestion        Plan     Cont current treatments  Add augment x 1 week  Add azelastine  ENT referral to evaluate anatomy  Cont allergy follow up      "

## 2024-03-11 SDOH — HEALTH STABILITY: PHYSICAL HEALTH: ON AVERAGE, HOW MANY MINUTES DO YOU ENGAGE IN EXERCISE AT THIS LEVEL?: 50 MIN

## 2024-03-11 SDOH — HEALTH STABILITY: PHYSICAL HEALTH: ON AVERAGE, HOW MANY DAYS PER WEEK DO YOU ENGAGE IN MODERATE TO STRENUOUS EXERCISE (LIKE A BRISK WALK)?: 4 DAYS

## 2024-03-11 ASSESSMENT — SOCIAL DETERMINANTS OF HEALTH (SDOH): HOW OFTEN DO YOU GET TOGETHER WITH FRIENDS OR RELATIVES?: ONCE A WEEK

## 2024-03-18 ENCOUNTER — OFFICE VISIT (OUTPATIENT)
Dept: FAMILY MEDICINE | Facility: CLINIC | Age: 39
End: 2024-03-18

## 2024-03-18 VITALS
HEIGHT: 69 IN | DIASTOLIC BLOOD PRESSURE: 71 MMHG | WEIGHT: 261 LBS | HEART RATE: 67 BPM | SYSTOLIC BLOOD PRESSURE: 125 MMHG | BODY MASS INDEX: 38.66 KG/M2 | OXYGEN SATURATION: 96 %

## 2024-03-18 DIAGNOSIS — I83.93 VARICOSE VEINS OF BOTH LOWER EXTREMITIES, UNSPECIFIED WHETHER COMPLICATED: ICD-10-CM

## 2024-03-18 DIAGNOSIS — R63.2 BINGE EATING: ICD-10-CM

## 2024-03-18 DIAGNOSIS — Z13.1 SCREENING FOR DIABETES MELLITUS: ICD-10-CM

## 2024-03-18 DIAGNOSIS — E66.09 CLASS 2 OBESITY DUE TO EXCESS CALORIES WITHOUT SERIOUS COMORBIDITY WITH BODY MASS INDEX (BMI) OF 38.0 TO 38.9 IN ADULT: ICD-10-CM

## 2024-03-18 DIAGNOSIS — Z13.6 SCREENING FOR CARDIOVASCULAR CONDITION: ICD-10-CM

## 2024-03-18 DIAGNOSIS — Z00.00 ROUTINE GENERAL MEDICAL EXAMINATION AT A HEALTH CARE FACILITY: Primary | ICD-10-CM

## 2024-03-18 DIAGNOSIS — E66.812 CLASS 2 OBESITY DUE TO EXCESS CALORIES WITHOUT SERIOUS COMORBIDITY WITH BODY MASS INDEX (BMI) OF 38.0 TO 38.9 IN ADULT: ICD-10-CM

## 2024-03-18 LAB
ANION GAP SERPL CALCULATED.3IONS-SCNC: 10 MMOL/L (ref 7–15)
BUN SERPL-MCNC: 14.3 MG/DL (ref 6–20)
CALCIUM SERPL-MCNC: 9.5 MG/DL (ref 8.6–10)
CHLORIDE SERPL-SCNC: 104 MMOL/L (ref 98–107)
CHOLEST SERPL-MCNC: 136 MG/DL
CREAT SERPL-MCNC: 0.79 MG/DL (ref 0.67–1.17)
DEPRECATED HCO3 PLAS-SCNC: 26 MMOL/L (ref 22–29)
EGFRCR SERPLBLD CKD-EPI 2021: >90 ML/MIN/1.73M2
FASTING STATUS PATIENT QL REPORTED: YES
GLUCOSE SERPL-MCNC: 86 MG/DL (ref 70–99)
HDLC SERPL-MCNC: 51 MG/DL
LDLC SERPL CALC-MCNC: 77 MG/DL
NONHDLC SERPL-MCNC: 85 MG/DL
POTASSIUM SERPL-SCNC: 4.5 MMOL/L (ref 3.4–5.3)
SODIUM SERPL-SCNC: 140 MMOL/L (ref 135–145)
TRIGL SERPL-MCNC: 39 MG/DL

## 2024-03-18 PROCEDURE — 80048 BASIC METABOLIC PNL TOTAL CA: CPT | Mod: 90

## 2024-03-18 PROCEDURE — 36415 COLL VENOUS BLD VENIPUNCTURE: CPT

## 2024-03-18 PROCEDURE — 80061 LIPID PANEL: CPT | Mod: QW

## 2024-03-18 PROCEDURE — 99395 PREV VISIT EST AGE 18-39: CPT

## 2024-03-18 PROCEDURE — 80048 BASIC METABOLIC PNL TOTAL CA: CPT

## 2024-03-18 PROCEDURE — 80061 LIPID PANEL: CPT

## 2024-03-18 RX ORDER — LISDEXAMFETAMINE DIMESYLATE 30 MG/1
30 CAPSULE ORAL EVERY MORNING
Qty: 30 CAPSULE | Refills: 0 | Status: SHIPPED | OUTPATIENT
Start: 2024-03-18 | End: 2024-06-22

## 2024-03-18 NOTE — PATIENT INSTRUCTIONS
Preventive Care Advice   This is general advice given by our system to help you stay healthy. However, your care team may have specific advice just for you. Please talk to your care team about your preventive care needs.  Nutrition  Eat 5 or more servings of fruits and vegetables each day.  Try wheat bread, brown rice and whole grain pasta (instead of white bread, rice, and pasta).  Get enough calcium and vitamin D. Check the label on foods and aim for 100% of the RDA (recommended daily allowance).  Lifestyle  Exercise at least 150 minutes each week   (30 minutes a day, 5 days a week).  Do muscle strengthening activities 2 days a week. These help control your weight and prevent disease.  No smoking.  Wear sunscreen to prevent skin cancer.  Have a dental exam and cleaning every 6 months.  Yearly exams  See your health care team every year to talk about:  Any changes in your health.  Any medicines your care team has prescribed.  Preventive care, family planning, and ways to prevent chronic diseases.  Shots (vaccines)   HPV shots (up to age 26), if you've never had them before.  Hepatitis B shots (up to age 59), if you've never had them before.  COVID-19 shot: Get this shot when it's due.  Flu shot: Get a flu shot every year.  Tetanus shot: Get a tetanus shot every 10 years.  Pneumococcal, hepatitis A, and RSV shots: Ask your care team if you need these based on your risk.  Shingles shot (for age 50 and up).  General health tests  Diabetes screening:  Starting at age 35, Get screened for diabetes at least every 3 years.  If you are younger than age 35, ask your care team if you should be screened for diabetes.  Cholesterol test: At age 39, start having a cholesterol test every 5 years, or more often if advised.  Bone density scan (DEXA): At age 50, ask your care team if you should have this scan for osteoporosis (brittle bones).  Hepatitis C: Get tested at least once in your life.  STIs (sexually transmitted  infections)  Before age 24: Ask your care team if you should be screened for STIs.  After age 24: Get screened for STIs if you're at risk. You are at risk for STIs (including HIV) if:  You are sexually active with more than one person.  You don't use condoms every time.  You or a partner was diagnosed with a sexually transmitted infection.  If you are at risk for HIV, ask about PrEP medicine to prevent HIV.  Get tested for HIV at least once in your life, whether you are at risk for HIV or not.  Cancer screening tests  Cervical cancer screening: If you have a cervix, begin getting regular cervical cancer screening tests at age 21. Most people who have regular screenings with normal results can stop after age 65. Talk about this with your provider.  Breast cancer scan (mammogram): If you've ever had breasts, begin having regular mammograms starting at age 40. This is a scan to check for breast cancer.  Colon cancer screening: It is important to start screening for colon cancer at age 45.  Have a colonoscopy test every 10 years (or more often if you're at risk) Or, ask your provider about stool tests like a FIT test every year or Cologuard test every 3 years.  To learn more about your testing options, visit: https://www.[employer]/565016.pdf.  For help making a decision, visit: https://bit.ly/mv60303.  Prostate cancer screening test: If you have a prostate and are age 55 to 69, ask your provider if you would benefit from a yearly prostate cancer screening test.  Lung cancer screening: If you are a current or former smoker age 50 to 80, ask your care team if ongoing lung cancer screenings are right for you.  For informational purposes only. Not to replace the advice of your health care provider. Copyright   2023 [employer]. All rights reserved. Clinically reviewed by the [facility]. [employer] 492153 - REV 01/24.    Learning About Stress  What is stress?     Stress is your  body's response to a hard situation. Your body can have a physical, emotional, or mental response. Stress is a fact of life for most people, and it affects everyone differently. What causes stress for you may not be stressful for someone else.  A lot of things can cause stress. You may feel stress when you go on a job interview, take a test, or run a race. This kind of short-term stress is normal and even useful. It can help you if you need to work hard or react quickly. For example, stress can help you finish an important job on time.  Long-term stress is caused by ongoing stressful situations or events. Examples of long-term stress include long-term health problems, ongoing problems at work, or conflicts in your family. Long-term stress can harm your health.  How does stress affect your health?  When you are stressed, your body responds as though you are in danger. It makes hormones that speed up your heart, make you breathe faster, and give you a burst of energy. This is called the fight-or-flight stress response. If the stress is over quickly, your body goes back to normal and no harm is done.  But if stress happens too often or lasts too long, it can have bad effects. Long-term stress can make you more likely to get sick, and it can make symptoms of some diseases worse. If you tense up when you are stressed, you may develop neck, shoulder, or low back pain. Stress is linked to high blood pressure and heart disease.  Stress also harms your emotional health. It can make you solorzano, tense, or depressed. Your relationships may suffer, and you may not do well at work or school.  What can you do to manage stress?  You can try these things to help manage stress:   Do something active. Exercise or activity can help reduce stress. Walking is a great way to get started. Even everyday activities such as housecleaning or yard work can help.  Try yoga or rosalinda chi. These techniques combine exercise and meditation. You may need  some training at first to learn them.  Do something you enjoy. For example, listen to music or go to a movie. Practice your hobby or do volunteer work.  Meditate. This can help you relax, because you are not worrying about what happened before or what may happen in the future.  Do guided imagery. Imagine yourself in any setting that helps you feel calm. You can use online videos, books, or a teacher to guide you.  Do breathing exercises. For example:  From a standing position, bend forward from the waist with your knees slightly bent. Let your arms dangle close to the floor.  Breathe in slowly and deeply as you return to a standing position. Roll up slowly and lift your head last.  Hold your breath for just a few seconds in the standing position.  Breathe out slowly and bend forward from the waist.  Let your feelings out. Talk, laugh, cry, and express anger when you need to. Talking with supportive friends or family, a counselor, or a светлана leader about your feelings is a healthy way to relieve stress. Avoid discussing your feelings with people who make you feel worse.  Write. It may help to write about things that are bothering you. This helps you find out how much stress you feel and what is causing it. When you know this, you can find better ways to cope.  What can you do to prevent stress?  You might try some of these things to help prevent stress:  Manage your time. This helps you find time to do the things you want and need to do.  Get enough sleep. Your body recovers from the stresses of the day while you are sleeping.  Get support. Your family, friends, and community can make a difference in how you experience stress.  Limit your news feed. Avoid or limit time on social media or news that may make you feel stressed.  Do something active. Exercise or activity can help reduce stress. Walking is a great way to get started.  Where can you learn more?  Go to https://www.healthwise.net/patiented  Enter N032 in the  "search box to learn more about \"Learning About Stress.\"  Current as of: October 24, 2023               Content Version: 14.0    3622-5686 [employer].   Care instructions adapted under license by your healthcare professional. If you have questions about a medical condition or this instruction, always ask your healthcare professional. [employer] disclaims any warranty or liability for your use of this information.      "

## 2024-03-18 NOTE — PROGRESS NOTES
"Preventive Care Visit  [facility]  Maggy Kaufman, MARY CNP, Family Medicine  Mar 18, 2024      Assessment & Plan     Routine general medical examination at a health care facility  Age-appropriate preventative health maintenance along with diet, exercise and healthy weight discussed.   - VENOUS COLLECTION  - Lipid Profile  - Lipid Profile    Class 2 obesity due to excess calories without serious comorbidity with body mass index (BMI) of 38.0 to 38.9 in adult  Binge eating  Previously was seen at a weight loss clinic and was started on Vyvanse in the past. Reports he does not remember starting the medication. Has taken phentermine that made him jittery. Reports a diagnosis as a child of ADHD. Weight has increased and having a hard time focusing. Discussed trial or restart of Vyanse. Side effects of medication reviewed. Recommend follow up in 1 month or sooner as needed. Patient agreeable to plan. All questions answered.   - lisdexamfetamine (VYVANSE) 30 MG capsule  Dispense: 30 capsule; Refill: 0  - VENOUS COLLECTION  - Lipid Profile  - Lipid Profile    Varicose veins of both lower extremities, unspecified whether complicated  Reviewed diagnosis. Discussed OTC symptomatic cares including compression stockings. May consider referral to [facility] as needed. Discussed follow up as needed for new, worsening or symptoms fail to improve. Patient agreeable to plan.     Screening for diabetes mellitus  - Basic metabolic panel  - VENOUS COLLECTION  - Basic metabolic panel    Screening for cardiovascular condition  - VENOUS COLLECTION  - Lipid Profile  - Lipid Profile           Patient has been advised of split billing requirements and indicates understanding: Yes        BMI  Estimated body mass index is 38.54 kg/m  as calculated from the following:    Height as of this encounter: 1.753 m (5' 9\").    Weight as of this encounter: 118.4 kg (261 lb).   Weight management plan: Discussed healthy diet and " exercise guidelines    Counseling  Appropriate preventive services were discussed with this patient, including applicable screening as appropriate for fall prevention, nutrition, physical activity, Tobacco-use cessation, weight loss and cognition.  Checklist reviewing preventive services available has been given to the patient.  Reviewed patient's diet, addressing concerns and/or questions.       Work on weight loss  Regular exercise  See Patient Instructions    Return in about 4 weeks (around 4/15/2024) for Annual Wellness Visit, Follow up.    Juan uLna is a 38 year old, presenting for the following:  Physical (Fasting)         Health Care Directive  Patient does not have a Health Care Directive or Living Will: Discussed advance care planning with patient; information given to patient to review.    HPI    Weight: Phentermine to help loose weight. Got off it in the last year. 2 kids at home. Gained 30 lb as back. Hard time focusing. Overstimulated at times with kids. ADHD as a kid. Kids are sleeping well, no sleep deprivation.     Varicose veins: been ongoing bugging him around his left lower leg calf.           3/11/2024   General Health   How would you rate your overall physical health? (!) FAIR   Feel stress (tense, anxious, or unable to sleep) Rather much   (!) STRESS CONCERN      3/11/2024   Nutrition   Three or more servings of calcium each day? Yes   Diet: Regular (no restrictions)   How many servings of fruit and vegetables per day? 4 or more   How many sweetened beverages each day? 0-1         3/11/2024   Exercise   Days per week of moderate/strenous exercise 4 days   Average minutes spent exercising at this level 50 min         3/11/2024   Social Factors   Frequency of gathering with friends or relatives Once a week   Worry food won't last until get money to buy more No   Food not last or not have enough money for food? No   Do you have housing?  Yes   Are you worried about losing your housing? No  "  Lack of transportation? No   Unable to get utilities (heat,electricity)? No         3/11/2024   Dental   Dentist two times every year? Yes         3/11/2024   TB Screening   Were you born outside of [place]?  No           Today's PHQ-2 Score:       3/18/2024     9:14 AM   PHQ-2 ( 1999 [employer])   Q1: Little interest or pleasure in doing things 0   Q2: Feeling down, depressed or hopeless 0   PHQ-2 Score 0         3/11/2024   Substance Use   Alcohol more than 3/day or more than 7/wk Not Applicable   Do you use any other substances recreationally? No     Social History     Tobacco Use    Smoking status: Former     Packs/day: 0.50     Years: 6.00     Additional pack years: 0.00     Total pack years: 3.00     Types: Cigarettes    Smokeless tobacco: Never   Substance Use Topics    Alcohol use: Not Currently    Drug use: No           3/11/2024   STI Screening   New sexual partner(s) since last STI/HIV test? No         3/11/2024   Contraception/Family Planning   Questions about contraception or family planning No        Reviewed and updated as needed this visit by Provider   Tobacco  Allergies  Meds  Problems  Med Hx  Surg Hx  Fam Hx            Past Medical History:   Diagnosis Date    Acne     folliculitis    Plantar fasciitis, bilateral     Seasonal allergies     Tobacco abuse     quit smoking in 2019. 10 pack year history.     Past Surgical History:   Procedure Laterality Date    ANKLE SURGERY  01/01/2008    fracture rt, ORIF plate and screws    KNEE SURGERY Left 2003    chipped bone     Lab work is in process      Review of Systems  Constitutional, HEENT, cardiovascular, pulmonary, GI, , musculoskeletal, neuro, skin, endocrine and psych systems are negative, except as otherwise noted.     Objective    Exam  /71   Pulse 67   Ht 1.753 m (5' 9\")   Wt 118.4 kg (261 lb)   SpO2 96%   BMI 38.54 kg/m     Estimated body mass index is 38.54 kg/m  as calculated from the following:    Height as of this encounter: " "1.753 m (5' 9\").    Weight as of this encounter: 118.4 kg (261 lb).    Physical Exam  GENERAL: alert and no distress  EYES: Eyes grossly normal to inspection, PERRL and conjunctivae and sclerae normal  HENT: ear canals and TM's normal, nose and mouth without ulcers or lesions  NECK: no adenopathy, no asymmetry, masses, or scars  RESP: lungs clear to auscultation - no rales, rhonchi or wheezes  CV: regular rate and rhythm, normal S1 S2, no S3 or S4, no murmur, click or rub, no peripheral edema  ABDOMEN: soft, nontender, no hepatosplenomegaly, no masses and bowel sounds normal  MS: no gross musculoskeletal defects noted, no edema  SKIN: no suspicious lesions or rashes and varicose veins to left lower leg and right upper leg  NEURO: Normal strength and tone, mentation intact and speech normal  PSYCH: mentation appears normal, affect normal/bright        Signed Electronically by: MARY Dias CNP    "

## 2024-04-12 RX ORDER — LISDEXAMFETAMINE DIMESYLATE 30 MG/1
30 CAPSULE ORAL EVERY MORNING
Qty: 30 CAPSULE | Refills: 0 | Status: CANCELLED | OUTPATIENT
Start: 2024-04-12

## 2024-04-15 ENCOUNTER — OFFICE VISIT (OUTPATIENT)
Dept: FAMILY MEDICINE | Facility: CLINIC | Age: 39
End: 2024-04-15

## 2024-04-15 VITALS
HEART RATE: 63 BPM | DIASTOLIC BLOOD PRESSURE: 73 MMHG | BODY MASS INDEX: 37.8 KG/M2 | SYSTOLIC BLOOD PRESSURE: 128 MMHG | OXYGEN SATURATION: 97 % | WEIGHT: 256 LBS

## 2024-04-15 DIAGNOSIS — E66.812 CLASS 2 OBESITY DUE TO EXCESS CALORIES WITHOUT SERIOUS COMORBIDITY WITH BODY MASS INDEX (BMI) OF 37.0 TO 37.9 IN ADULT: Primary | ICD-10-CM

## 2024-04-15 DIAGNOSIS — E66.09 CLASS 2 OBESITY DUE TO EXCESS CALORIES WITHOUT SERIOUS COMORBIDITY WITH BODY MASS INDEX (BMI) OF 37.0 TO 37.9 IN ADULT: Primary | ICD-10-CM

## 2024-04-15 DIAGNOSIS — R63.2 BINGE EATING: ICD-10-CM

## 2024-04-15 DIAGNOSIS — I83.93 VARICOSE VEINS OF BOTH LOWER EXTREMITIES, UNSPECIFIED WHETHER COMPLICATED: ICD-10-CM

## 2024-04-15 PROCEDURE — 99214 OFFICE O/P EST MOD 30 MIN: CPT

## 2024-04-15 PROCEDURE — G2211 COMPLEX E/M VISIT ADD ON: HCPCS

## 2024-04-15 RX ORDER — LISDEXAMFETAMINE DIMESYLATE 30 MG/1
30 CAPSULE ORAL DAILY
Qty: 30 CAPSULE | Refills: 0 | Status: SHIPPED | OUTPATIENT
Start: 2024-05-16 | End: 2024-06-15

## 2024-04-15 RX ORDER — LISDEXAMFETAMINE DIMESYLATE 30 MG/1
30 CAPSULE ORAL DAILY
Qty: 30 CAPSULE | Refills: 0 | Status: SHIPPED | OUTPATIENT
Start: 2024-04-15 | End: 2024-05-15

## 2024-04-15 RX ORDER — LISDEXAMFETAMINE DIMESYLATE 30 MG/1
30 CAPSULE ORAL DAILY
Qty: 30 CAPSULE | Refills: 0 | Status: SHIPPED | OUTPATIENT
Start: 2024-06-16 | End: 2024-07-16

## 2024-04-15 NOTE — PROGRESS NOTES
Assessment & Plan     Class 2 obesity due to excess calories without serious comorbidity with body mass index (BMI) of 37.0 to 37.9 in adult  Binge eating  Taking Vyvanse 30 mg daily. Medication is going well. Symptoms improving, no side effects from medication. Finds it assist with ADHD symptoms. Discussed common side effects of stimulant medication. MNPMP reviewed. Appropriate refill history observed. Controlled substance agreement on file/completed today. .  Recommend follow up in 3 months or sooner as needed. Patient agreeable to plan. All questions answered.   - lisdexamfetamine (VYVANSE) 30 MG capsule  Dispense: 30 capsule; Refill: 0  - lisdexamfetamine (VYVANSE) 30 MG capsule  Dispense: 30 capsule; Refill: 0  - lisdexamfetamine (VYVANSE) 30 MG capsule  Dispense: 30 capsule; Refill: 0    Varicose veins of both lower extremities, unspecified whether complicated  Reviewed diagnosis. Discussed OTC symptomatic cares including compression stockings. Referral placed to [facility]. Reviewed follow up. Patient agreeable to plan. All questions answered.   - Vascular Medicine Referral        Work on weight loss  Regular exercise  See Patient Instructions    Return in about 3 months (around 7/15/2024) for Follow up.    Juan Luna is a 38 year old, presenting for the following health issues:  RECHECK (Vyvanse - CSA due)    History of Present Illness       Reason for visit:  Follw up.    He eats 2-3 servings of fruits and vegetables daily.He consumes 0 sweetened beverage(s) daily.He exercises with enough effort to increase his heart rate 30 to 60 minutes per day.  He exercises with enough effort to increase his heart rate 5 days per week.   He is taking medications regularly.         Medication Followup of Vyvanse  Taking Medication as prescribed: yes  Side Effects:  None  Medication Helping Symptoms:  yes. Feels that irritability is way down. Mental concatenation has improved. Not constantly needing  caffeine. Likes the slow onset. Slowly tapers before night time and doesn't crash. No problems with sleep. Appetite has been good. Not over eating.   At night more hungry around dinner time. Plans to work with a nutritionist, monthly check ins.   Paying $50 a month for the prescription. Able to pay the cost.       Review of Systems  Constitutional, HEENT, cardiovascular, pulmonary, GI, , musculoskeletal, neuro, skin, endocrine and psych systems are negative, except as otherwise noted.      Objective    /73   Pulse 63   Wt 116.1 kg (256 lb)   SpO2 97%   BMI 37.80 kg/m    Body mass index is 37.8 kg/m .  Physical Exam   GENERAL: alert and no distress  RESP: lungs clear to auscultation - no rales, rhonchi or wheezes  CV: regular rate and rhythm, normal S1 S2, no S3 or S4, no murmur, click or rub, no peripheral edema  PSYCH: mentation appears normal, affect normal/bright          Signed Electronically by: MARY Dias CNP

## 2024-04-15 NOTE — LETTER
[facility]  04/15/24  Patient: Yamil Spencer  YOB: 1985  Medical Record Number: [id number]                                                                                  Non-Opioid Controlled Substance Agreement    This is an agreement between you and your provider regarding safe and appropriate use of controlled substances prescribed by your care team. Controlled substances are?medicines that can cause physical and mental dependence (abuse).     There are strict laws about having and using these medicines. We here at [facility] are  committed to working with you in your efforts to get better. To support you in this work, we'll help you schedule regular office appointments for medicine refills. If we must cancel or change your appointment for any reason, we'll make sure you have enough medicine to last until your next appointment.     As a Provider, I will:   Listen carefully to your concerns while treating you with respect.   Recommend a treatment plan that I believe is in your best interest and may involve therapies other than medicine.    Talk with you often about the possible benefits and the risk of harm of any medicine that we prescribe for you.  Assess the safety of this medicine and check how well it works.    Provide a plan on how to taper (discontinue or go off) using this medicine if the decision is made to stop its use.      ::  As a Patient, I understand controlled substances:     Are prescribed by my care provider to help me function or work and manage my condition(s).?  Are strong medicines and can cause serious side effects.     Need to be taken exactly as prescribed.?Combining controlled substances with certain medicines or chemicals (such as illegal drugs, alcohol, sedatives, sleeping pills, and benzodiazepines) can be dangerous or even fatal.? If I stop taking my medicines suddenly, I may have severe withdrawal symptoms.     The risks, benefits, and side  effects of these medicine(s) were explained to me. I agree that:    I will take part in other treatments as advised by my care team. This may be psychiatry or counseling, physical therapy, behavioral therapy, group treatment or a referral to specialist.    I will keep all my appointments and understand this is part of the monitoring of controlled substances.?My care team may require an office visit for EVERY controlled substance refill. If I miss appointments or don t follow instructions, my care team may stop my medicine    I will take my medicines as prescribed. I will not change the dose or schedule unless my care team tells me to. There will be no refills if I run out early.      I may be asked to come to the clinic and complete a urine drug test or complete a pill count. If I don t give a urine sample or participate in a pill count, the care team may stop my medicine.    I will only receive controlled substance prescriptions from this clinic. If I am treated by another provider, I will tell them that I am taking controlled substances and that I have a treatment agreement with this provider. I will inform my [facility] care team within one business day if I am given a prescription for any controlled substance by another healthcare provider. My [facility] care team can contact other providers and pharmacists about my use of any medicines.    It is up to me to make sure that I don't run out of my medicines on weekends or holidays.?If my care team is willing to refill my prescription without a visit, I must request refills only during office hours. Refills may take up to 3 business days to process. I will use one pharmacy to fill all my controlled substance prescriptions. I will notify the clinic about any changes to my insurance or medicine availability.    I am responsible for my prescriptions. If the medicine/prescription is lost, stolen or destroyed, it will not be replaced.?I also agree not to  share controlled substance medicines with anyone.     I am aware I should not use any illegal or recreational drugs. I agree not to drink alcohol unless my care team says I can.     If I enroll in the [place] Medical Cannabis program, I will tell my care team before my next refill.    I will tell my care team right away if I become pregnant, have a new medical problem treated outside of my regular clinic, or have a change in my medicines.     I understand that this medicine can affect my thinking, judgment and reaction time.? Alcohol and drugs affect the brain and body, which can affect the safety of my driving. Being under the influence of alcohol or drugs can affect my decision-making, behaviors, personal safety and the safety of others. Driving while impaired (DWI) can occur if a person is driving, operating or in physical control of a car, motorcycle, boat, snowmobile, ATV, motorbike, off-road vehicle or any other motor vehicle ([place] Statute 169A.20). I understand the risk if I choose to drive or operate any vehicle or machinery.    I understand that if I do not follow any of the conditions above, my prescriptions or treatment may be stopped or changed.   I agree that my provider, clinic care team and pharmacy may work with any city, state or federal law enforcement agency that investigates the misuse, sale or other diversion of my controlled medicine. I will allow my provider to discuss my care with, or share a copy of, this agreement with any other treating provider, pharmacy or emergency room where I receive care.     I have read this agreement and have asked questions about anything I did not understand.    ________________________________________________________  Patient Signature - Yamil Spencer     ___________________                   Date     ________________________________________________________  Provider Signature - MARY Dias CNP       ___________________                   Date      ________________________________________________________  Witness Signature (required if provider not present while patient signing)          ___________________                   Date

## 2024-04-30 ENCOUNTER — TRANSFERRED RECORDS (OUTPATIENT)
Dept: FAMILY MEDICINE | Facility: CLINIC | Age: 39
End: 2024-04-30

## 2024-05-01 ENCOUNTER — PRE VISIT (OUTPATIENT)
Dept: OTOLARYNGOLOGY | Facility: CLINIC | Age: 39
End: 2024-05-01

## 2024-05-30 ENCOUNTER — OFFICE VISIT (OUTPATIENT)
Dept: FAMILY MEDICINE | Facility: CLINIC | Age: 39
End: 2024-05-30

## 2024-05-30 VITALS
SYSTOLIC BLOOD PRESSURE: 127 MMHG | DIASTOLIC BLOOD PRESSURE: 68 MMHG | BODY MASS INDEX: 38.4 KG/M2 | WEIGHT: 260 LBS | TEMPERATURE: 98.2 F | OXYGEN SATURATION: 98 % | HEART RATE: 76 BPM

## 2024-05-30 DIAGNOSIS — J30.9 ALLERGIC RHINITIS, UNSPECIFIED SEASONALITY, UNSPECIFIED TRIGGER: ICD-10-CM

## 2024-05-30 DIAGNOSIS — J01.20 ACUTE NON-RECURRENT ETHMOIDAL SINUSITIS: Primary | ICD-10-CM

## 2024-05-30 DIAGNOSIS — R09.81 CHRONIC NASAL CONGESTION: ICD-10-CM

## 2024-05-30 LAB
INFLUENZA A (RMG): NEGATIVE
INFLUENZA B (RMG): NEGATIVE
SARS ANTIGEN (RMG): NEGATIVE

## 2024-05-30 PROCEDURE — 99214 OFFICE O/P EST MOD 30 MIN: CPT | Performed by: FAMILY MEDICINE

## 2024-05-30 PROCEDURE — G2211 COMPLEX E/M VISIT ADD ON: HCPCS | Performed by: FAMILY MEDICINE

## 2024-05-30 PROCEDURE — 87428 SARSCOV & INF VIR A&B AG IA: CPT | Performed by: FAMILY MEDICINE

## 2024-05-30 RX ORDER — EPINEPHRINE 0.3 MG/.3ML
INJECTION SUBCUTANEOUS
COMMUNITY

## 2024-05-30 NOTE — PROGRESS NOTES
Lots of pollen recently  Took a montelukast  Heart racing a bit....  Head hurt, low grade temp to 100.  3 ibuprofen helped.  On clariten every day.    ROS:  General and CV completed and negative except as noted above    Histories: reviewed    OBJECTIVE:                                                    /68   Pulse 76   Temp 98.2  F (36.8  C) (Oral)   Wt 117.9 kg (260 lb)   SpO2 98%   BMI 38.40 kg/m    Body mass index is 38.4 kg/m .   APPEARANCE: = Relaxed and in no distress  Conj/Eyelids = noninjected and lids and lashes are without inflammation  PERRLA/Irises = Pupils Round Reactive to Light and Irisis without inflammation  Ears/Nose = bilateral maxillary  Ear canals and TM's = Canals are not inflammed and have none or little wax and the drums are not injected and have a light reflex      ASSESSMENT/PLAN:                                                    Quality gaps reviewed    Jeremy was seen today for fever.    Diagnoses and all orders for this visit:    Acute non-recurrent ethmoidal sinusitis  -     amoxicillin-clavulanate (AUGMENTIN) 875-125 MG tablet; Take 1 tablet by mouth 2 times daily    Allergic rhinitis, unspecified seasonality, unspecified trigger  -     amoxicillin-clavulanate (AUGMENTIN) 875-125 MG tablet; Take 1 tablet by mouth 2 times daily    Chronic nasal congestion  -     Influenza + SARS Antigen ([facility])  -     amoxicillin-clavulanate (AUGMENTIN) 875-125 MG tablet; Take 1 tablet by mouth 2 times daily      Stop montelukas due to tachy response  Call if continues  Probable sinus infection.    Antibiotic possibly indicated.  Discussed main side effects from antibiotics can be GI upset, loose stools, etc   The patient does get regular sinus infections.   Referral to ENT not indicated.  Consider pro-biotic tablet to help reduce GI complications from antibiotic use  Call if any serious diarrhea develops within the next several weeks of taking this antibiotics.   Recommended symptomatic cares  such as decongestants, expectorants, steroid nasal spray for next few weeks, over the counter anti-inflammatory medications, and/or nasal/sinus lavage with Netti pot or Sinus Rinse Kit.   Decongestant nasal sprays can be used, but use with caution and not for more than 5-7 days.   Call if any worsening in symptoms or failure to improve.     Work on weight loss  Regular exercise    Health maintenance items are reviewed in Epic and correct as of today:    Nash Ortiz MD  [facility]  [phone]    For any issues my office # is [phone]

## 2024-06-22 ENCOUNTER — MYC REFILL (OUTPATIENT)
Dept: FAMILY MEDICINE | Facility: CLINIC | Age: 39
End: 2024-06-22

## 2024-06-22 DIAGNOSIS — R63.2 BINGE EATING: ICD-10-CM

## 2024-06-22 DIAGNOSIS — E66.812 CLASS 2 OBESITY DUE TO EXCESS CALORIES WITHOUT SERIOUS COMORBIDITY WITH BODY MASS INDEX (BMI) OF 38.0 TO 38.9 IN ADULT: ICD-10-CM

## 2024-06-22 DIAGNOSIS — E66.09 CLASS 2 OBESITY DUE TO EXCESS CALORIES WITHOUT SERIOUS COMORBIDITY WITH BODY MASS INDEX (BMI) OF 38.0 TO 38.9 IN ADULT: ICD-10-CM

## 2024-06-24 RX ORDER — LISDEXAMFETAMINE DIMESYLATE 30 MG/1
30 CAPSULE ORAL EVERY MORNING
Qty: 30 CAPSULE | Refills: 0 | Status: SHIPPED | OUTPATIENT
Start: 2024-06-24

## 2024-06-24 NOTE — TELEPHONE ENCOUNTER
CONTROLLED SUBSTANCE REFILL REQUEST FOR lisdexamfetamine (VYVANSE) 30 MG capsule   DOSE: 30 MG - # 30  SIG: Take 1 capsule (30 mg) by mouth daily for 30 days - Oral       LAST REFILL: 5/19/24    LAST APPT RELATED: 4/15/24    NEXT APPT: 7/15/24      CONTROLLED SUBSTANCE AGREEMENT: 4/15/24    URINE DRUG SCREEN: Not on file        FILL HISTORY PER :            REFILL ROUTED TO Maggy Kaufman FOR REVIEW

## 2024-07-15 ENCOUNTER — VIRTUAL VISIT (OUTPATIENT)
Dept: FAMILY MEDICINE | Facility: CLINIC | Age: 39
End: 2024-07-15

## 2024-07-15 DIAGNOSIS — E66.812 CLASS 2 OBESITY DUE TO EXCESS CALORIES WITHOUT SERIOUS COMORBIDITY WITH BODY MASS INDEX (BMI) OF 38.0 TO 38.9 IN ADULT: Primary | ICD-10-CM

## 2024-07-15 DIAGNOSIS — J30.2 SEASONAL ALLERGIC RHINITIS, UNSPECIFIED TRIGGER: ICD-10-CM

## 2024-07-15 DIAGNOSIS — E66.09 CLASS 2 OBESITY DUE TO EXCESS CALORIES WITHOUT SERIOUS COMORBIDITY WITH BODY MASS INDEX (BMI) OF 38.0 TO 38.9 IN ADULT: Primary | ICD-10-CM

## 2024-07-15 DIAGNOSIS — R63.2 BINGE EATING: ICD-10-CM

## 2024-07-15 DIAGNOSIS — F90.9 ATTENTION DEFICIT HYPERACTIVITY DISORDER (ADHD), UNSPECIFIED ADHD TYPE: ICD-10-CM

## 2024-07-15 PROCEDURE — 99443 PR PHYSICIAN TELEPHONE EVALUATION 21-30 MIN: CPT

## 2024-07-15 RX ORDER — LISDEXAMFETAMINE DIMESYLATE 30 MG/1
30 CAPSULE ORAL DAILY
Qty: 30 CAPSULE | Refills: 0 | Status: SHIPPED | OUTPATIENT
Start: 2024-09-13 | End: 2024-10-13

## 2024-07-15 RX ORDER — LISDEXAMFETAMINE DIMESYLATE 30 MG/1
30 CAPSULE ORAL DAILY
Qty: 30 CAPSULE | Refills: 0 | Status: SHIPPED | OUTPATIENT
Start: 2024-07-15 | End: 2024-08-14

## 2024-07-15 RX ORDER — MONTELUKAST SODIUM 10 MG/1
10 TABLET ORAL AT BEDTIME
Qty: 90 TABLET | Refills: 3 | Status: SHIPPED | OUTPATIENT
Start: 2024-07-15

## 2024-07-15 RX ORDER — ZAFIRLUKAST 20 MG/1
1 TABLET, FILM COATED ORAL
COMMUNITY
Start: 2024-05-30

## 2024-07-15 RX ORDER — LISDEXAMFETAMINE DIMESYLATE 30 MG/1
30 CAPSULE ORAL DAILY
Qty: 30 CAPSULE | Refills: 0 | Status: SHIPPED | OUTPATIENT
Start: 2024-08-14 | End: 2024-09-13

## 2024-07-15 NOTE — PROGRESS NOTES
Jeremy is a 39 year old who is being evaluated via a billable telephone visit.    What phone number would you like to be contacted at? [phone]  How would you like to obtain your AVS? [place]  Originating Location (pt. Location): Home    Distant Location (provider location):  On-site    Assessment & Plan     Class 2 obesity due to excess calories without serious comorbidity with body mass index (BMI) of 38.0 to 38.9 in adult  Binge eating  Attention deficit hyperactivity disorder (ADHD), unspecified ADHD type  Taking Vyvanse 30 mg daily. Medication is going well. Symptoms stable, no side effects from medication. Finds it assist with ADHD symptoms and appetite. Discussed common side effects of stimulant medication. MNPMP reviewed. Appropriate refill history observed. Nutrition referral placed today as well. Also dicussed resources including [place] nutritionist and psychology today.  Recommend follow up in 3 months or sooner as needed. Patient agreeable to plan. All questions answered.   - Adult Nutrition  Referral  - lisdexamfetamine (VYVANSE) 30 MG capsule  Dispense: 30 capsule; Refill: 0  - lisdexamfetamine (VYVANSE) 30 MG capsule  Dispense: 30 capsule; Refill: 0  - lisdexamfetamine (VYVANSE) 30 MG capsule  Dispense: 30 capsule; Refill: 0            See Patient Instructions    Return in about 3 months (around 10/15/2024), or if symptoms worsen or fail to improve, for Follow up.    Subjective   Jeremy is a 39 year old, presenting for the following health issues:  Recheck Medication    HPI     Medication Followup of Vyvanse 30 mg daily  Taking Medication as prescribed: yes  Side Effects:  Fatigue/tired when not on it  Medication Helping Symptoms:  yes    Improving with focus at work and at home. Wearing of perfectly at 8-9 pm. On Sundays wont take if just hanging out at the house. The morning tired and extra time to wake up. When had to go without it felt sluggish. Curbs appetite. End of the day a little bit  more hungry. Not binging though. Would like to see a nutritionist. Felt like a therapy nutritionist worked well in the past.         Review of Systems  Constitutional, HEENT, cardiovascular, pulmonary, GI, , musculoskeletal, neuro, skin, endocrine and psych systems are negative, except as otherwise noted.      Objective           Vitals:  No vitals were obtained today due to virtual visit.    Physical Exam   General: Alert and no distress //Respiratory: No audible wheeze, cough, or shortness of breath // Psychiatric:  Appropriate affect, tone, and pace of words          Phone call duration: 18 minutes  Signed Electronically by: MARY Dias CNP

## 2024-07-15 NOTE — TELEPHONE ENCOUNTER
Med: Montelukast    LOV (related): 5/3/24 with Dr. Ortiz       Due for F/U around: 3/2025 for cpx    Next Appt: None (had VV with Maggy Kaufman, CNP 7/15/24)

## 2024-07-19 DIAGNOSIS — L73.2 AXILLARY HIDRADENITIS SUPPURATIVA: ICD-10-CM

## 2024-07-19 RX ORDER — CLINDAMYCIN PHOSPHATE 10 UG/ML
LOTION TOPICAL 2 TIMES DAILY
Qty: 60 ML | Refills: 11 | Status: SHIPPED | OUTPATIENT
Start: 2024-07-19

## 2024-07-19 NOTE — TELEPHONE ENCOUNTER
Med: Clindamycin Lotion    LOV (related): 5/25/23      Due for F/U around: N/a    Next Appt: None

## 2024-10-21 ENCOUNTER — VIRTUAL VISIT (OUTPATIENT)
Dept: FAMILY MEDICINE | Facility: CLINIC | Age: 39
End: 2024-10-21

## 2024-10-21 DIAGNOSIS — R63.2 BINGE EATING: ICD-10-CM

## 2024-10-21 DIAGNOSIS — E66.812 CLASS 2 OBESITY DUE TO EXCESS CALORIES WITHOUT SERIOUS COMORBIDITY WITH BODY MASS INDEX (BMI) OF 38.0 TO 38.9 IN ADULT: ICD-10-CM

## 2024-10-21 DIAGNOSIS — F90.9 ATTENTION DEFICIT HYPERACTIVITY DISORDER (ADHD), UNSPECIFIED ADHD TYPE: Primary | ICD-10-CM

## 2024-10-21 DIAGNOSIS — E66.09 CLASS 2 OBESITY DUE TO EXCESS CALORIES WITHOUT SERIOUS COMORBIDITY WITH BODY MASS INDEX (BMI) OF 38.0 TO 38.9 IN ADULT: ICD-10-CM

## 2024-10-21 PROCEDURE — 99213 OFFICE O/P EST LOW 20 MIN: CPT | Mod: 95

## 2024-10-21 PROCEDURE — G2211 COMPLEX E/M VISIT ADD ON: HCPCS | Mod: 95

## 2024-10-21 RX ORDER — LISDEXAMFETAMINE DIMESYLATE 30 MG/1
30 CAPSULE ORAL DAILY
Qty: 30 CAPSULE | Refills: 0 | Status: SHIPPED | OUTPATIENT
Start: 2024-11-20 | End: 2024-12-20

## 2024-10-21 RX ORDER — LISDEXAMFETAMINE DIMESYLATE 30 MG/1
30 CAPSULE ORAL DAILY
Qty: 30 CAPSULE | Refills: 0 | Status: SHIPPED | OUTPATIENT
Start: 2024-12-20 | End: 2025-01-19

## 2024-10-21 RX ORDER — LISDEXAMFETAMINE DIMESYLATE 30 MG/1
30 CAPSULE ORAL DAILY
Qty: 30 CAPSULE | Refills: 0 | Status: SHIPPED | OUTPATIENT
Start: 2024-10-21 | End: 2024-11-20

## 2024-10-21 RX ORDER — LISDEXAMFETAMINE DIMESYLATE 30 MG/1
30 CAPSULE ORAL EVERY MORNING
Qty: 30 CAPSULE | Refills: 0 | Status: CANCELLED | OUTPATIENT
Start: 2024-10-21

## 2024-10-21 NOTE — PROGRESS NOTES
"Jeremy is a 39 year old who is being evaluated via a billable video visit.    How would you like to obtain your AVS? MyChart  If the video visit is dropped, the invitation should be resent by: Text to cell phone: [phone]  Will anyone else be joining your video visit? No      Assessment & Plan     Class 2 obesity due to excess calories without serious comorbidity with body mass index (BMI) of 38.0 to 38.9 in adult  Binge eating  Attention deficit hyperactivity disorder (ADHD), unspecified ADHD type  Taking Vyvanse 30 mg daily. Medication is going well. Symptoms stable, no side effects from medication. Finds it assist with ADHD symptoms and appetite. Discussed common side effects of stimulant medication. [place]MP reviewed. Reviewed continued recommendation of nutrition follow up.  Recommend follow up in 3 months or sooner as needed. Patient agreeable to plan. All questions answered.   - lisdexamfetamine (VYVANSE) 30 MG capsule  Dispense: 30 capsule; Refill: 0  - lisdexamfetamine (VYVANSE) 30 MG capsule  Dispense: 30 capsule; Refill: 0  - lisdexamfetamine (VYVANSE) 30 MG capsule  Dispense: 30 capsule; Refill: 0            BMI  Estimated body mass index is 38.4 kg/m  as calculated from the following:    Height as of 3/18/24: 1.753 m (5' 9\").    Weight as of 5/30/24: 117.9 kg (260 lb).   Weight management plan: Discussed healthy diet and exercise guidelines      Work on weight loss  Regular exercise  See Patient Instructions    Return in about 3 months (around 1/21/2025) for Follow up.    Subjective   Jeremy is a 39 year old, presenting for the following health issues:  Recheck Medication (Vyvanse going very well, refill needed)    HPI     Medication Followup of Vyvanse 30 mg daily  Taking Medication as prescribed: yes  Side Effects:  none  Medication Helping Symptoms:  yes     When going to sleep falls asleep easily. After 5-6 hours wakes up then a hard time going back to bed. Improving with focus at work and at home. " Wearing of perfectly at night time around 8-9 pm. Previously on Sundays wont take if just hanging out at the house. Fatigue more when not taking it on Sundays so taking it more regularly. Curbs appetite still. End of the day a little bit more hungry around dinner time. Not binging though. Last night looking at nutritionist. Looked into [employer] unsure of cost. Found a lady to call and see. Back up will go through [place]. Referral placed last visit.     Review of Systems  Constitutional, HEENT, cardiovascular, pulmonary, GI, , musculoskeletal, neuro, skin, endocrine and psych systems are negative, except as otherwise noted.      Objective           Vitals:  No vitals were obtained today due to virtual visit.    Physical Exam   GENERAL: alert and no distress  EYES: Eyes grossly normal to inspection.  No discharge or erythema, or obvious scleral/conjunctival abnormalities.  RESP: No audible wheeze, cough, or visible cyanosis.    SKIN: Visible skin clear. No significant rash, abnormal pigmentation or lesions.  NEURO: Cranial nerves grossly intact.  Mentation and speech appropriate for age.  PSYCH: Appropriate affect, tone, and pace of words          Video-Visit Details    Type of service:  Video Visit   Originating Location (pt. Location): Home    Distant Location (provider location):  On-site  Platform used for Video Visit: [employer]  Signed Electronically by: MARY Dias CNP

## 2025-01-28 ENCOUNTER — VIRTUAL VISIT (OUTPATIENT)
Dept: FAMILY MEDICINE | Facility: CLINIC | Age: 40
End: 2025-01-28

## 2025-01-28 DIAGNOSIS — R63.2 BINGE EATING: ICD-10-CM

## 2025-01-28 DIAGNOSIS — E66.812 CLASS 2 OBESITY DUE TO EXCESS CALORIES WITHOUT SERIOUS COMORBIDITY WITH BODY MASS INDEX (BMI) OF 38.0 TO 38.9 IN ADULT: ICD-10-CM

## 2025-01-28 DIAGNOSIS — F90.9 ATTENTION DEFICIT HYPERACTIVITY DISORDER (ADHD), UNSPECIFIED ADHD TYPE: ICD-10-CM

## 2025-01-28 DIAGNOSIS — E66.09 CLASS 2 OBESITY DUE TO EXCESS CALORIES WITHOUT SERIOUS COMORBIDITY WITH BODY MASS INDEX (BMI) OF 38.0 TO 38.9 IN ADULT: ICD-10-CM

## 2025-01-28 RX ORDER — LISDEXAMFETAMINE DIMESYLATE 30 MG/1
30 CAPSULE ORAL DAILY
Qty: 30 CAPSULE | Refills: 0 | Status: SHIPPED | OUTPATIENT
Start: 2025-01-28 | End: 2025-02-27

## 2025-01-28 RX ORDER — LISDEXAMFETAMINE DIMESYLATE 30 MG/1
30 CAPSULE ORAL DAILY
Qty: 30 CAPSULE | Refills: 0 | Status: SHIPPED | OUTPATIENT
Start: 2025-03-29 | End: 2025-04-28

## 2025-01-28 RX ORDER — LISDEXAMFETAMINE DIMESYLATE 30 MG/1
30 CAPSULE ORAL DAILY
Qty: 30 CAPSULE | Refills: 0 | Status: SHIPPED | OUTPATIENT
Start: 2025-02-27 | End: 2025-03-29

## 2025-01-28 NOTE — PROGRESS NOTES
Jeremy is a 39 year old who is being evaluated via a billable video visit.    How would you like to obtain your AVS? MyChart  If the video visit is dropped, the invitation should be resent by: Text to cell phone: [phone]  Will anyone else be joining your video visit? No      Assessment & Plan     Class 2 obesity due to excess calories without serious comorbidity with body mass index (BMI) of 38.0 to 38.9 in adult  Attention deficit hyperactivity disorder (ADHD), unspecified ADHD type  Taking Vyvanse 30 mg daily. Medication is going well. Symptoms stable, no side effects from medication. Finds it assist with ADHD symptoms and appetite. Discussed common side effects of stimulant medication. PDMP reviewed. Appropriate refill history observed. Reviewed continued recommendation of nutrition follow up. Recommend follow up in 3 months with annual physical or sooner as needed. Patient agreeable to plan. All questions answered.   - lisdexamfetamine (VYVANSE) 30 MG capsule  Dispense: 30 capsule; Refill: 0  - lisdexamfetamine (VYVANSE) 30 MG capsule  Dispense: 30 capsule; Refill: 0  - lisdexamfetamine (VYVANSE) 30 MG capsule  Dispense: 30 capsule; Refill: 0        Work on weight loss  Regular exercise  See Patient Instructions    Return in about 3 months (around 4/28/2025) for Follow up.    Subjective   Jeremy is a 39 year old, presenting for the following health issues:  Recheck Medication (Check on Vyvanse)    HPI     Medication follow up: Taking Vyvanse 30 mg daily. Going great. Taking it every day. Wasn't taking on Sundays but now is. Helps with the kids. Cut caffeine down and sleep has been going well minus young kids. Before more restless. Appetite: never got a chance to meet with nutritionist. Started intermittent fasting. Stops eating at 7pm-8pm. Then waits until lunch the next day. 14-16 hours fasting. Not binging after fasting.       Review of Systems  Constitutional, HEENT, cardiovascular, pulmonary, GI, ,  musculoskeletal, neuro, skin, endocrine and psych systems are negative, except as otherwise noted.      Objective         Vitals:  No vitals were obtained today due to virtual visit.    Physical Exam   GENERAL: alert and no distress  EYES: Eyes grossly normal to inspection.  No discharge or erythema, or obvious scleral/conjunctival abnormalities.  RESP: No audible wheeze, cough, or visible cyanosis.    SKIN: Visible skin clear. No significant rash, abnormal pigmentation or lesions.  NEURO: Cranial nerves grossly intact.  Mentation and speech appropriate for age.  PSYCH: Appropriate affect, tone, and pace of words        Video-Visit Details    Type of service:  Video Visit   Originating Location (pt. Location): Home    Distant Location (provider location):  On-site  Platform used for Video Visit: Obdulia  Signed Electronically by: MARY Dias CNP

## 2025-02-03 DIAGNOSIS — F90.9 ATTENTION DEFICIT HYPERACTIVITY DISORDER (ADHD), UNSPECIFIED ADHD TYPE: ICD-10-CM

## 2025-02-03 DIAGNOSIS — E66.812 CLASS 2 OBESITY DUE TO EXCESS CALORIES WITHOUT SERIOUS COMORBIDITY WITH BODY MASS INDEX (BMI) OF 38.0 TO 38.9 IN ADULT: ICD-10-CM

## 2025-02-03 DIAGNOSIS — E66.09 CLASS 2 OBESITY DUE TO EXCESS CALORIES WITHOUT SERIOUS COMORBIDITY WITH BODY MASS INDEX (BMI) OF 38.0 TO 38.9 IN ADULT: ICD-10-CM

## 2025-02-03 DIAGNOSIS — R63.2 BINGE EATING: ICD-10-CM

## 2025-02-03 RX ORDER — LISDEXAMFETAMINE DIMESYLATE 30 MG/1
30 CAPSULE ORAL DAILY
Qty: 30 CAPSULE | Refills: 0 | Status: SHIPPED | OUTPATIENT
Start: 2025-04-04 | End: 2025-05-04

## 2025-02-03 RX ORDER — LISDEXAMFETAMINE DIMESYLATE 30 MG/1
30 CAPSULE ORAL DAILY
Qty: 30 CAPSULE | Refills: 0 | Status: CANCELLED | OUTPATIENT
Start: 2025-02-03

## 2025-02-03 RX ORDER — LISDEXAMFETAMINE DIMESYLATE 30 MG/1
30 CAPSULE ORAL DAILY
Qty: 30 CAPSULE | Refills: 0 | Status: SHIPPED | OUTPATIENT
Start: 2025-02-03 | End: 2025-03-05

## 2025-02-03 RX ORDER — LISDEXAMFETAMINE DIMESYLATE 30 MG/1
30 CAPSULE ORAL DAILY
Qty: 30 CAPSULE | Refills: 0 | Status: SHIPPED | OUTPATIENT
Start: 2025-03-05 | End: 2025-04-04

## 2025-02-03 NOTE — TELEPHONE ENCOUNTER
Patient called clinic requesting prescription for generic Vyvanse be sent to [facility] pharmacy-prescription at [facility] is unable to be filled due to supply issues. Prescription entered and routed to Maggy Kaufman CNP for review. Hilaria Keen

## 2025-02-27 DIAGNOSIS — E66.09 CLASS 2 OBESITY DUE TO EXCESS CALORIES WITHOUT SERIOUS COMORBIDITY WITH BODY MASS INDEX (BMI) OF 38.0 TO 38.9 IN ADULT: ICD-10-CM

## 2025-02-27 DIAGNOSIS — R63.2 BINGE EATING: ICD-10-CM

## 2025-02-27 DIAGNOSIS — F90.9 ATTENTION DEFICIT HYPERACTIVITY DISORDER (ADHD), UNSPECIFIED ADHD TYPE: ICD-10-CM

## 2025-02-27 DIAGNOSIS — E66.812 CLASS 2 OBESITY DUE TO EXCESS CALORIES WITHOUT SERIOUS COMORBIDITY WITH BODY MASS INDEX (BMI) OF 38.0 TO 38.9 IN ADULT: ICD-10-CM

## 2025-02-27 RX ORDER — LISDEXAMFETAMINE DIMESYLATE 30 MG/1
30 CAPSULE ORAL DAILY
Qty: 30 CAPSULE | Refills: 0 | Status: SHIPPED | OUTPATIENT
Start: 2025-03-05

## 2025-02-27 NOTE — TELEPHONE ENCOUNTER
Patient calls stating flying to [place] on 3/4/25 for 2 weeks and will run out of lisdexamfetamine while there.   Has valid rx at [facility] for a fill but can't fill it until 3/5/25.   Asks if we can call to okay fill sooner so can take on his trip with him.   Nurse called pharmacist to give verbal okay to fill rx tomorrow 2/28 but pharmacist could not get system to override the date on rx on file. Needs new rx sent to allow to fill sooner.     Last related visit: 1/28/25 with Maggy Kaufman CNP     Queued up for Maggy Kaufman CNP to review and sign.  Nichole Malloy RN

## 2025-04-01 DIAGNOSIS — F90.9 ATTENTION DEFICIT HYPERACTIVITY DISORDER (ADHD), UNSPECIFIED ADHD TYPE: ICD-10-CM

## 2025-04-01 DIAGNOSIS — R63.2 BINGE EATING: ICD-10-CM

## 2025-04-01 DIAGNOSIS — E66.812 CLASS 2 OBESITY DUE TO EXCESS CALORIES WITHOUT SERIOUS COMORBIDITY WITH BODY MASS INDEX (BMI) OF 38.0 TO 38.9 IN ADULT: ICD-10-CM

## 2025-04-01 DIAGNOSIS — E66.09 CLASS 2 OBESITY DUE TO EXCESS CALORIES WITHOUT SERIOUS COMORBIDITY WITH BODY MASS INDEX (BMI) OF 38.0 TO 38.9 IN ADULT: ICD-10-CM

## 2025-04-01 RX ORDER — LISDEXAMFETAMINE DIMESYLATE 30 MG/1
30 CAPSULE ORAL DAILY
Qty: 30 CAPSULE | Refills: 0 | Status: SHIPPED | OUTPATIENT
Start: 2025-04-04

## 2025-04-01 RX ORDER — LISDEXAMFETAMINE DIMESYLATE 30 MG/1
30 CAPSULE ORAL DAILY
Qty: 30 CAPSULE | Refills: 0 | Status: SHIPPED | OUTPATIENT
Start: 2025-04-04 | End: 2025-04-01

## 2025-04-28 SDOH — HEALTH STABILITY: PHYSICAL HEALTH: ON AVERAGE, HOW MANY DAYS PER WEEK DO YOU ENGAGE IN MODERATE TO STRENUOUS EXERCISE (LIKE A BRISK WALK)?: 5 DAYS

## 2025-04-28 SDOH — HEALTH STABILITY: PHYSICAL HEALTH: ON AVERAGE, HOW MANY MINUTES DO YOU ENGAGE IN EXERCISE AT THIS LEVEL?: 50 MIN

## 2025-04-28 ASSESSMENT — SOCIAL DETERMINANTS OF HEALTH (SDOH): HOW OFTEN DO YOU GET TOGETHER WITH FRIENDS OR RELATIVES?: ONCE A WEEK

## 2025-05-01 NOTE — PATIENT INSTRUCTIONS
Patient Education   Preventive Care Advice   This is general advice given by our system to help you stay healthy. However, your care team may have specific advice just for you. Please talk to your care team about your preventive care needs.  Nutrition  Eat 5 or more servings of fruits and vegetables each day.  Try wheat bread, brown rice and whole grain pasta (instead of white bread, rice, and pasta).  Get enough calcium and vitamin D. Check the label on foods and aim for 100% of the RDA (recommended daily allowance).  Lifestyle  Exercise at least 150 minutes each week  (30 minutes a day, 5 days a week).  Do muscle strengthening activities 2 days a week. These help control your weight and prevent disease.  No smoking.  Wear sunscreen to prevent skin cancer.  Have a dental exam and cleaning every 6 months.  Yearly exams  See your health care team every year to talk about:  Any changes in your health.  Any medicines your care team has prescribed.  Preventive care, family planning, and ways to prevent chronic diseases.  Shots (vaccines)   HPV shots (up to age 26), if you've never had them before.  Hepatitis B shots (up to age 59), if you've never had them before.  COVID-19 shot: Get this shot when it's due.  Flu shot: Get a flu shot every year.  Tetanus shot: Get a tetanus shot every 10 years.  Pneumococcal, hepatitis A, and RSV shots: Ask your care team if you need these based on your risk.  Shingles shot (for age 50 and up)  General health tests  Diabetes screening:  Starting at age 35, Get screened for diabetes at least every 3 years.  If you are younger than age 35, ask your care team if you should be screened for diabetes.  Cholesterol test: At age 39, start having a cholesterol test every 5 years, or more often if advised.  Bone density scan (DEXA): At age 50, ask your care team if you should have this scan for osteoporosis (brittle bones).  Hepatitis C: Get tested at least once in your life.  STIs (sexually  transmitted infections)  Before age 24: Ask your care team if you should be screened for STIs.  After age 24: Get screened for STIs if you're at risk. You are at risk for STIs (including HIV) if:  You are sexually active with more than one person.  You don't use condoms every time.  You or a partner was diagnosed with a sexually transmitted infection.  If you are at risk for HIV, ask about PrEP medicine to prevent HIV.  Get tested for HIV at least once in your life, whether you are at risk for HIV or not.  Cancer screening tests  Cervical cancer screening: If you have a cervix, begin getting regular cervical cancer screening tests starting at age 21.  Breast cancer scan (mammogram): If you've ever had breasts, begin having regular mammograms starting at age 40. This is a scan to check for breast cancer.  Colon cancer screening: It is important to start screening for colon cancer at age 45.  Have a colonoscopy test every 10 years (or more often if you're at risk) Or, ask your provider about stool tests like a FIT test every year or Cologuard test every 3 years.  To learn more about your testing options, visit:   .  For help making a decision, visit:   https://bit.ly/xu84857.  Prostate cancer screening test: If you have a prostate, ask your care team if a prostate cancer screening test (PSA) at age 55 is right for you.  Lung cancer screening: If you are a current or former smoker ages 50 to 80, ask your care team if ongoing lung cancer screenings are right for you.  For informational purposes only. Not to replace the advice of your health care provider. Copyright   2023 [employer]. All rights reserved. Clinically reviewed by the [facility] Transitions Program. [employer] 501593 - REV 01/24.

## 2025-05-05 ENCOUNTER — OFFICE VISIT (OUTPATIENT)
Dept: FAMILY MEDICINE | Facility: CLINIC | Age: 40
End: 2025-05-05

## 2025-05-05 VITALS
HEIGHT: 69 IN | SYSTOLIC BLOOD PRESSURE: 119 MMHG | OXYGEN SATURATION: 98 % | WEIGHT: 253.6 LBS | DIASTOLIC BLOOD PRESSURE: 66 MMHG | HEART RATE: 75 BPM | BODY MASS INDEX: 37.56 KG/M2

## 2025-05-05 DIAGNOSIS — Z00.00 ROUTINE GENERAL MEDICAL EXAMINATION AT A HEALTH CARE FACILITY: Primary | ICD-10-CM

## 2025-05-05 DIAGNOSIS — F90.9 ATTENTION DEFICIT HYPERACTIVITY DISORDER (ADHD), UNSPECIFIED ADHD TYPE: ICD-10-CM

## 2025-05-05 DIAGNOSIS — E66.09 CLASS 2 OBESITY DUE TO EXCESS CALORIES WITHOUT SERIOUS COMORBIDITY WITH BODY MASS INDEX (BMI) OF 37.0 TO 37.9 IN ADULT: ICD-10-CM

## 2025-05-05 DIAGNOSIS — Z13.6 SCREENING FOR CARDIOVASCULAR CONDITION: ICD-10-CM

## 2025-05-05 DIAGNOSIS — E66.812 CLASS 2 OBESITY DUE TO EXCESS CALORIES WITHOUT SERIOUS COMORBIDITY WITH BODY MASS INDEX (BMI) OF 37.0 TO 37.9 IN ADULT: ICD-10-CM

## 2025-05-05 DIAGNOSIS — Z13.1 SCREENING FOR DIABETES MELLITUS: ICD-10-CM

## 2025-05-05 LAB
ANION GAP SERPL CALCULATED.3IONS-SCNC: 13 MMOL/L (ref 7–15)
BUN SERPL-MCNC: 19.5 MG/DL (ref 6–20)
CALCIUM SERPL-MCNC: 9.6 MG/DL (ref 8.8–10.4)
CHLORIDE SERPL-SCNC: 100 MMOL/L (ref 98–107)
CHOLEST SERPL-MCNC: 165 MG/DL
CREAT SERPL-MCNC: 0.73 MG/DL (ref 0.67–1.17)
EGFRCR SERPLBLD CKD-EPI 2021: >90 ML/MIN/1.73M2
FASTING STATUS PATIENT QL REPORTED: YES
FASTING STATUS PATIENT QL REPORTED: YES
GLUCOSE SERPL-MCNC: 95 MG/DL (ref 70–99)
HCO3 SERPL-SCNC: 24 MMOL/L (ref 22–29)
HDLC SERPL-MCNC: 52 MG/DL
LDLC SERPL CALC-MCNC: 103 MG/DL
NONHDLC SERPL-MCNC: 113 MG/DL
POTASSIUM SERPL-SCNC: 4.2 MMOL/L (ref 3.4–5.3)
SODIUM SERPL-SCNC: 137 MMOL/L (ref 135–145)
TRIGL SERPL-MCNC: 48 MG/DL

## 2025-05-05 PROCEDURE — 80061 LIPID PANEL: CPT

## 2025-05-05 PROCEDURE — 80061 LIPID PANEL: CPT | Mod: QW

## 2025-05-05 PROCEDURE — 36415 COLL VENOUS BLD VENIPUNCTURE: CPT

## 2025-05-05 PROCEDURE — 99395 PREV VISIT EST AGE 18-39: CPT

## 2025-05-05 PROCEDURE — 80048 BASIC METABOLIC PNL TOTAL CA: CPT

## 2025-05-05 PROCEDURE — 80048 BASIC METABOLIC PNL TOTAL CA: CPT | Mod: 90

## 2025-05-05 RX ORDER — LISDEXAMFETAMINE DIMESYLATE 30 MG/1
30 CAPSULE ORAL DAILY
Qty: 30 CAPSULE | Refills: 0 | Status: SHIPPED | OUTPATIENT
Start: 2025-07-04 | End: 2025-08-03

## 2025-05-05 RX ORDER — LISDEXAMFETAMINE DIMESYLATE 30 MG/1
30 CAPSULE ORAL DAILY
Qty: 30 CAPSULE | Refills: 0 | Status: SHIPPED | OUTPATIENT
Start: 2025-05-05 | End: 2025-06-04

## 2025-05-05 RX ORDER — FLUTICASONE PROPIONATE 50 MCG
SPRAY, SUSPENSION (ML) NASAL
COMMUNITY
Start: 2025-04-01

## 2025-05-05 RX ORDER — LISDEXAMFETAMINE DIMESYLATE 30 MG/1
30 CAPSULE ORAL DAILY
Qty: 30 CAPSULE | Refills: 0 | Status: SHIPPED | OUTPATIENT
Start: 2025-06-04 | End: 2025-07-04

## 2025-05-05 NOTE — PROGRESS NOTES
"Preventive Care Visit  [facility]  MARY Dias CNP, Family Medicine  May 5, 2025      Assessment & Plan     Routine general medical examination at a health care facility  Age-appropriate preventative health maintenance along with diet, exercise and healthy weight discussed.     Class 2 obesity due to excess calories without serious comorbidity with body mass index (BMI) of 37.0 to 37.9 in adult  Attention deficit hyperactivity disorder (ADHD), unspecified ADHD type  Taking Vyvanse 30 mg daily. Medication is going well. Symptoms stable, no side effects from medication. Finds it assist with ADHD symptoms and appetite. Discussed common side effects of stimulant medication. PDMP reviewed. Appropriate refill history observed. Reviewed continued recommendation of nutrition follow up. Recommend follow up in 6 months for a medication check or sooner as needed. Patient agreeable to plan. All questions answered.   - lisdexamfetamine (VYVANSE) 30 MG capsule  Dispense: 30 capsule; Refill: 0  - lisdexamfetamine (VYVANSE) 30 MG capsule  Dispense: 30 capsule; Refill: 0  - lisdexamfetamine (VYVANSE) 30 MG capsule  Dispense: 30 capsule; Refill: 0    Screening for cardiovascular condition  - VENOUS COLLECTION  - Lipid panel  - Lipid panel    Screening for diabetes mellitus  - Basic metabolic panel  - VENOUS COLLECTION  - Basic metabolic panel      Patient has been advised of split billing requirements and indicates understanding: Yes        BMI  Estimated body mass index is 37.45 kg/m  as calculated from the following:    Height as of this encounter: 1.753 m (5' 9\").    Weight as of this encounter: 115 kg (253 lb 9.6 oz).   Weight management plan: Discussed healthy diet and exercise guidelines    Counseling  Appropriate preventive services were addressed with this patient via screening, questionnaire, or discussion as appropriate for fall prevention, nutrition, physical activity, Tobacco-use cessation, social " engagement, weight loss and cognition.  Checklist reviewing preventive services available has been given to the patient.  Reviewed patient's diet, addressing concerns and/or questions.       Follow-up  Return in about 53 weeks (around 5/11/2026) for Annual Wellness Visit.    Juan Luna is a 39 year old, presenting for the following:  Physical (Fasting/No concerns ) and Health Maintenance (Immunizations: Covid due )           HPI      ADHD and weight: Taking Vyvanse 30 mg daily Going well.  Every 6 months with insurance.     Health maintenance  Vaccines: Declines covid      Advance Care Planning    Discussed advance care planning with patient; however, patient declined at this time.        4/28/2025   General Health   How would you rate your overall physical health? Good   Feel stress (tense, anxious, or unable to sleep) Only a little   (!) STRESS CONCERN      4/28/2025   Nutrition   Three or more servings of calcium each day? Yes   Diet: Regular (no restrictions)   How many servings of fruit and vegetables per day? 4 or more   How many sweetened beverages each day? 0-1         4/28/2025   Exercise   Days per week of moderate/strenous exercise 5 days   Average minutes spent exercising at this level 50 min         4/28/2025   Social Factors   Frequency of gathering with friends or relatives Once a week   Worry food won't last until get money to buy more No   Food not last or not have enough money for food? No   Do you have housing? (Housing is defined as stable permanent housing and does not include staying outside in a car, in a tent, in an abandoned building, in an overnight shelter, or couch-surfing.) Yes   Are you worried about losing your housing? No   Lack of transportation? No   Unable to get utilities (heat,electricity)? No         4/28/2025   Dental   Dentist two times every year? Yes         Today's PHQ-2 Score:       5/5/2025     9:53 AM   PHQ-2 ( 1999 Pf[employer])   Q1: Little interest or pleasure in  "doing things 0   Q2: Feeling down, depressed or hopeless 0   PHQ-2 Score 0    Q1: Little interest or pleasure in doing things Not at all   Q2: Feeling down, depressed or hopeless Not at all   PHQ-2 Score 0       Patient-reported           4/28/2025   Substance Use   Alcohol more than 3/day or more than 7/wk No   Do you use any other substances recreationally? No     Social History     Tobacco Use    Smoking status: Former     Current packs/day: 0.50     Average packs/day: 0.5 packs/day for 6.0 years (3.0 ttl pk-yrs)     Types: Cigarettes    Smokeless tobacco: Never   Substance Use Topics    Alcohol use: Not Currently    Drug use: No           4/28/2025   STI Screening   New sexual partner(s) since last STI/HIV test? No         4/28/2025   Contraception/Family Planning   Questions about contraception or family planning No        Reviewed and updated as needed this visit by Provider   Tobacco  Allergies  Meds  Problems  Med Hx  Surg Hx  Fam Hx            Past Medical History:   Diagnosis Date    Acne     folliculitis    Plantar fasciitis, bilateral     Seasonal allergies     Tobacco abuse     quit smoking in 2019. 10 pack year history.     Past Surgical History:   Procedure Laterality Date    ANKLE SURGERY  01/01/2008    fracture rt, ORIF plate and screws    KNEE SURGERY Left 2003    chipped bone     Lab work is in process      Review of Systems  Constitutional, HEENT, cardiovascular, pulmonary, GI, , musculoskeletal, neuro, skin, endocrine and psych systems are negative, except as otherwise noted.     Objective    Exam  /66   Pulse 75   Ht 1.753 m (5' 9\")   Wt 115 kg (253 lb 9.6 oz)   SpO2 98%   BMI 37.45 kg/m     Estimated body mass index is 37.45 kg/m  as calculated from the following:    Height as of this encounter: 1.753 m (5' 9\").    Weight as of this encounter: 115 kg (253 lb 9.6 oz).    Physical Exam  GENERAL: alert and no distress  EYES: Eyes grossly normal to inspection, PERRL and " conjunctivae and sclerae normal  HENT: ear canals and TM's normal, nose and mouth without ulcers or lesions  NECK: no adenopathy, no asymmetry, masses, or scars  RESP: lungs clear to auscultation - no rales, rhonchi or wheezes  CV: regular rate and rhythm, normal S1 S2, no S3 or S4, no murmur, click or rub, no peripheral edema  ABDOMEN: soft, nontender, no hepatosplenomegaly, no masses and bowel sounds normal  MS: no gross musculoskeletal defects noted, no edema  SKIN: no suspicious lesions or rashes  NEURO: Normal strength and tone, mentation intact and speech normal  PSYCH: mentation appears normal, affect normal/bright        Signed Electronically by: MARY Dias CNP

## 2025-06-13 ENCOUNTER — MYC REFILL (OUTPATIENT)
Dept: FAMILY MEDICINE | Facility: CLINIC | Age: 40
End: 2025-06-13

## 2025-06-13 DIAGNOSIS — L73.2 AXILLARY HIDRADENITIS SUPPURATIVA: ICD-10-CM

## 2025-06-13 NOTE — TELEPHONE ENCOUNTER
Med: Clindamycin lotion    LOV (related): 5/5/25 CPX with Maggy Kaufman, CNP       Due for F/U around: 5/2026 for CPX    Next Appt: None

## 2025-06-15 RX ORDER — CLINDAMYCIN PHOSPHATE 10 UG/ML
LOTION TOPICAL 2 TIMES DAILY
Qty: 60 ML | Refills: 11 | Status: SHIPPED | OUTPATIENT
Start: 2025-06-15

## 2025-08-12 ENCOUNTER — MYC REFILL (OUTPATIENT)
Dept: FAMILY MEDICINE | Facility: CLINIC | Age: 40
End: 2025-08-12

## 2025-08-12 DIAGNOSIS — E66.812 CLASS 2 OBESITY DUE TO EXCESS CALORIES WITHOUT SERIOUS COMORBIDITY WITH BODY MASS INDEX (BMI) OF 37.0 TO 37.9 IN ADULT: ICD-10-CM

## 2025-08-12 DIAGNOSIS — F90.9 ATTENTION DEFICIT HYPERACTIVITY DISORDER (ADHD), UNSPECIFIED ADHD TYPE: ICD-10-CM

## 2025-08-12 DIAGNOSIS — E66.09 CLASS 2 OBESITY DUE TO EXCESS CALORIES WITHOUT SERIOUS COMORBIDITY WITH BODY MASS INDEX (BMI) OF 37.0 TO 37.9 IN ADULT: ICD-10-CM

## 2025-08-12 RX ORDER — LISDEXAMFETAMINE DIMESYLATE 30 MG/1
30 CAPSULE ORAL DAILY
Qty: 30 CAPSULE | Refills: 0 | Status: CANCELLED | OUTPATIENT
Start: 2025-08-12

## 2025-08-13 RX ORDER — LISDEXAMFETAMINE DIMESYLATE 30 MG/1
30 CAPSULE ORAL DAILY
Qty: 30 CAPSULE | Refills: 0 | Status: SHIPPED | OUTPATIENT
Start: 2025-10-12 | End: 2025-11-11

## 2025-08-13 RX ORDER — LISDEXAMFETAMINE DIMESYLATE 30 MG/1
30 CAPSULE ORAL DAILY
Qty: 30 CAPSULE | Refills: 0 | Status: SHIPPED | OUTPATIENT
Start: 2025-08-13 | End: 2025-09-12

## 2025-08-13 RX ORDER — LISDEXAMFETAMINE DIMESYLATE 30 MG/1
30 CAPSULE ORAL DAILY
Qty: 30 CAPSULE | Refills: 0 | Status: SHIPPED | OUTPATIENT
Start: 2025-09-12 | End: 2025-10-12